# Patient Record
Sex: FEMALE | Race: WHITE | NOT HISPANIC OR LATINO | Employment: UNEMPLOYED | URBAN - METROPOLITAN AREA
[De-identification: names, ages, dates, MRNs, and addresses within clinical notes are randomized per-mention and may not be internally consistent; named-entity substitution may affect disease eponyms.]

---

## 2017-03-09 ENCOUNTER — HISTORICAL (OUTPATIENT)
Dept: LAB | Facility: HOSPITAL | Age: 67
End: 2017-03-09

## 2017-04-07 ENCOUNTER — HISTORICAL (OUTPATIENT)
Dept: RADIOLOGY | Facility: HOSPITAL | Age: 67
End: 2017-04-07

## 2018-04-12 ENCOUNTER — HISTORICAL (OUTPATIENT)
Dept: RADIOLOGY | Facility: HOSPITAL | Age: 68
End: 2018-04-12

## 2019-05-15 ENCOUNTER — HISTORICAL (OUTPATIENT)
Dept: RADIOLOGY | Facility: HOSPITAL | Age: 69
End: 2019-05-15

## 2019-06-17 ENCOUNTER — HISTORICAL (OUTPATIENT)
Dept: LAB | Facility: HOSPITAL | Age: 69
End: 2019-06-17

## 2019-06-17 ENCOUNTER — HISTORICAL (OUTPATIENT)
Dept: ADMINISTRATIVE | Facility: HOSPITAL | Age: 69
End: 2019-06-17

## 2019-06-19 LAB
FINAL CULTURE: NORMAL
FINAL CULTURE: NORMAL

## 2020-12-30 ENCOUNTER — HISTORICAL (OUTPATIENT)
Dept: RADIOLOGY | Facility: HOSPITAL | Age: 70
End: 2020-12-30

## 2022-02-02 ENCOUNTER — HISTORICAL (OUTPATIENT)
Dept: RADIOLOGY | Facility: HOSPITAL | Age: 72
End: 2022-02-02

## 2022-02-02 ENCOUNTER — HISTORICAL (OUTPATIENT)
Dept: ADMINISTRATIVE | Facility: HOSPITAL | Age: 72
End: 2022-02-02

## 2022-04-09 ENCOUNTER — HISTORICAL (OUTPATIENT)
Dept: ADMINISTRATIVE | Facility: HOSPITAL | Age: 72
End: 2022-04-09

## 2022-04-25 VITALS
WEIGHT: 190.69 LBS | HEIGHT: 61 IN | BODY MASS INDEX: 36 KG/M2 | DIASTOLIC BLOOD PRESSURE: 82 MMHG | SYSTOLIC BLOOD PRESSURE: 138 MMHG

## 2022-05-02 NOTE — HISTORICAL [facility]ERNER
This is a historical note converted from Samantha. Formatting and pictures may have been removed.  Please reference Samantha for original formatting and attached multimedia. Chief Complaint  surgery clearance for Dr Gomez  Procedure: Right total knee Arthroplasty  History of Present Illness  had left TKA with dr spence, didnt tolerate percocet, did ok with ultram  ready for right TKA with dr gomez  no history or operative/anesthesia complications/ no history of clotting/bleeding issues;  was apparently cleared by dr kearney  Review of Systems  ?14 point Review of Systems performed with no exceptions for new complaints other than as noted in HPI.  Physical Exam  Vitals & Measurements  HR:?76(Peripheral)? BP:?120/70?  HT:?156.21?cm? WT:?86.4?kg? BMI:?35.41?  ?  PHYSICAL EXAM  ?   Well developed, well nourished, NAD, alert and oriented x4  Vitals reviewed  Head: NCAT  Eyes: EOMI, conjunctiva WNL,  Ears: TMs and EACs clear  Nose: Mucosa WNL, No discharge, No sinus tenderness  O/P: No erythema or exudate  Neck: supple, FROM, no LA, no thyromegaly, no bruits auscultated  CV: RRR , peripheral pulses intact  Pulmonary: Effort normal and breath sounds normal, no wheeze  Abdomen: soft, NTND, no HSM;? ?NABS; no peritoneal signs?????  Musculoskeletal:? , FROM, neg SLR,  Skin: warm, dry, intact  Neuro: no motor/sensory deficits, reflexes 2+, cranial nerves grossly intact, cerebellar function appears intact  Lymphadenopathy: no abnormalities noted  Psychiatric: Cooperative, appropriate mood and affect, appears to have normal judgment  ?  ?  ?  ?  trace to 1+ edema bilat  Assessment/Plan  1.?Arthritis of right knee?M17.11  ?needs total knee  Ordered:  Office/Outpatient Visit Level 3 Established 64274 PC, Arthritis of right knee  Preop testing  Coronary artery disease  Diabetes  Edema, [facility] - AFP, 06/17/19 11:58:00 CDT  ?  2.?Preop examination?Z01.818  ?cleared for surgery  Ordered:  Office/Outpatient Visit Level 3 Established  [id number] PC, Arthritis of right knee  Preop testing  Coronary artery disease  Diabetes  Edema, [facility] - AFP, 06/17/19 11:58:00 CDT  Respiratory Culture, Now collect, 06/17/19 14:33:00 CDT, Nasopharyngeal MRSA, Nurse collect, by CLIENT, Micro Spec, Stop date 06/17/19 14:33:00 CDT, Preop testing  Urine Culture [id number], Routine collect, 06/17/19 11:25:00 CDT, Urine, Collected, by CLIENT, Urine, Stop date 06/17/19 11:25:00 CDT, [id number], Preop testing  ?  3.?Coronary artery disease?I25.10  ?asymptomatic  Ordered:  Office/Outpatient Visit Level 3 Established 66806 PC, Arthritis of right knee  Preop testing  Coronary artery disease  Diabetes  Edema, [facility] - AFP, 06/17/19 11:58:00 CDT  ?  4.?Diabetes?E11.9  ?last A1c was 7.4  Ordered:  Office/Outpatient Visit Level 3 Established 85298 PC, Arthritis of right knee  Preop testing  Coronary artery disease  Diabetes  Edema, [facility] - AFP, 06/17/19 11:58:00 CDT  ?  5.?Edema?R60.9  mild  Ordered:  Office/Outpatient Visit Level 3 Established [id number] PC, Arthritis of right knee  Preop testing  Coronary artery disease  Diabetes  Edema, [facility] - AFP, 06/17/19 11:58:00 CDT  ?   Problem List/Past Medical History  Ongoing  Arthritis of right knee  Carotid artery stenosis  Carotid bruit  Coronary artery disease  Diabetes  Edema  Erythrasma  History of retinal vein occlusion  HLD (hyperlipidemia)  HNP (herniated nucleus pulposus), lumbar  HTN (hypertension)  Mitral valve prolapse  NAFLD (nonalcoholic fatty liver disease)  Osteoarthritis  Pulmonary nodule  Wellness examination  Historical  No qualifying data  Procedure/Surgical History  Total replacement of left knee joint (04.2014)  Total replacement of right knee joint (2013)  Appendectomy  BSO - Bilateral salpingo-oophorectomy  Caesarean section  right eye laser due to retinal occlusion  CYNTHIA - Total abdominal hysterectomy   Medications  AMLODIPINE TAB 5MG, 5 mg= 1 tab(s), Oral, Daily  glimepiride 2 mg oral tablet, See  Instructions, 1 refills  hydrochlorothiazide-lisinopril 25 mg-20 mg oral tablet, See Instructions, 1 refills  metFORMIN 1000 mg oral tablet, See Instructions, 1 refills  pravastatin 40 mg oral tablet, See Instructions, 1 refills  Tresiba FlexTouch 100 units/mL subcutaneous solution, See Instructions, 5 refills  Allergies  penicillins  Social History  Abuse/Neglect  No, 06/17/2019  Alcohol  Never, 05/23/2018  Employment/School  Retired, 05/30/2018  Home/Environment  Lives with Alone. Living situation: Home/Independent., 05/30/2018  Substance Use  Never, 05/30/2018  Tobacco  Former smoker, quit more than 30 days ago, N/A, 06/17/2019  Family History  Alzheimers disease: Mother.  CVA - Cerebrovascular accident: Sister.  Congestive heart failure: Mother and Father.  Obesity: Sister.  Immunizations  Vaccine Date Status   pneumococcal 13-valent conjugate vaccine 09/23/2013 Recorded   influenza virus vaccine, inactivated 11/18/2012 Recorded   pneumococcal 23-polyvalent vaccine 12/11/2007 Recorded   Health Maintenance  Health Maintenance  ???Pending?(in the next year)  ??? ??OverDue  ??? ? ? ?Coronary Artery Disease Maintenance-Lipid Lowering Therapy due??and every?  ??? ? ? ?Pneumococcal Vaccine due??and every?  ??? ? ? ?Advance Directive due??01/01/19??and every 1??year(s)  ??? ? ? ?Alcohol Misuse Screening due??01/01/19??and every 1??year(s)  ??? ? ? ?Cognitive Screening due??01/01/19??and every 1??year(s)  ??? ? ? ?Fall Risk Assessment due??01/01/19??and every 1??year(s)  ??? ? ? ?Functional Assessment due??01/01/19??and every 1??year(s)  ??? ? ? ?Geriatric Depression Screening due??01/01/19??and every 1??year(s)  ??? ??Due?  ??? ? ? ?ADL Screening due??06/17/19??and every 1??year(s)  ??? ? ? ?Aspirin Therapy for CVD Prevention due??06/17/19??and every 1??year(s)  ??? ? ? ?Bone Density Screening due??06/17/19??Variable frequency  ??? ? ? ?Colorectal Screening ([facility]) due??06/17/19??and every?  ??? ? ? ?Coronary  Artery Disease Maintenance-Antiplatelet Agent Prescribed due??06/17/19??and every?  ??? ? ? ?Diabetes Maintenance-Eye Exam due??06/17/19??and every?  ??? ? ? ?Diabetes Maintenance-Foot Exam due??06/17/19??and every?  ??? ? ? ?Diabetes Maintenance-Microalbumin due??06/17/19??Variable frequency  ??? ? ? ?Hypertension Management-Education due??06/17/19??and every 1??year(s)  ??? ? ? ?Tetanus Vaccine due??06/17/19??and every 10??year(s)  ??? ? ? ?Zoster Vaccine due??06/17/19??and every 100??year(s)  ??? ??Due In Future?  ??? ? ? ?Obesity Screening not due until??01/01/20??and every 1??year(s)  ??? ? ? ?Diabetes Maintenance-Fasting Lipid Profile not due until??04/29/20??and every 1??year(s)  ??? ? ? ?Diabetes Maintenance-HgbA1c not due until??06/16/20??and every 1??year(s)  ??? ? ? ?Hypertension Management-Blood Pressure not due until??06/16/20??and every 1??year(s)  ??? ? ? ?Hypertension Management-BMP not due until??06/16/20??and every 1??year(s)  ???Satisfied?(in the past 1 year)  ??? ??Satisfied?  ??? ? ? ?Blood Pressure Screening on??06/17/19.??Satisfied by Flora Copeland LPN  ??? ? ? ?Body Mass Index Check on??06/17/19.??Satisfied by Flora Copeland LPN  ??? ? ? ?Breast Cancer Screening ([facility]) on??05/15/19.??Satisfied by Candice Travis  ??? ? ? ?Coronary Artery Disease Maintenance-Lipid Lowering Therapy on??02/18/19.??Satisfied by Ady Mares MD  ??? ? ? ?Diabetes Maintenance-Serum Creatinine on??06/17/19.??Satisfied by Itzel Santos  ??? ? ? ?Diabetes Screening on??06/17/19.??Satisfied by Itzel Santos  ??? ? ? ?Hypertension Management-BMP on??06/17/19.??Satisfied by Itzel Santos  ??? ? ? ?Influenza Vaccine on??09/26/18.??Satisfied by Nunu Zuniga MA  ??? ? ? ?Lipid Screening on??04/30/19.??Satisfied by Itzel Santos  ??? ? ? ?Obesity Screening on??06/17/19.??Satisfied by Flora Copeland LPN  ?  ?

## 2022-05-17 DIAGNOSIS — E11.65 UNCONTROLLED TYPE 2 DIABETES MELLITUS WITH HYPERGLYCEMIA: Primary | ICD-10-CM

## 2023-01-12 ENCOUNTER — TELEPHONE (OUTPATIENT)
Dept: ENDOCRINOLOGY | Facility: CLINIC | Age: 73
End: 2023-01-12

## 2023-01-12 ENCOUNTER — OFFICE VISIT (OUTPATIENT)
Dept: ENDOCRINOLOGY | Facility: CLINIC | Age: 73
End: 2023-01-12
Payer: MEDICARE

## 2023-01-12 VITALS
SYSTOLIC BLOOD PRESSURE: 138 MMHG | HEIGHT: 62 IN | RESPIRATION RATE: 16 BRPM | DIASTOLIC BLOOD PRESSURE: 78 MMHG | HEART RATE: 69 BPM | WEIGHT: 194.69 LBS | BODY MASS INDEX: 35.83 KG/M2

## 2023-01-12 DIAGNOSIS — E11.65 UNCONTROLLED TYPE 2 DIABETES MELLITUS WITH HYPERGLYCEMIA: Primary | ICD-10-CM

## 2023-01-12 DIAGNOSIS — E55.9 VITAMIN D DEFICIENCY: ICD-10-CM

## 2023-01-12 DIAGNOSIS — Z78.0 ASYMPTOMATIC MENOPAUSAL STATE: ICD-10-CM

## 2023-01-12 DIAGNOSIS — E83.52 HYPERCALCEMIA: ICD-10-CM

## 2023-01-12 LAB
ALBUMIN SERPL-MCNC: 4.2 G/DL (ref 3.4–4.8)
ALBUMIN/GLOB SERPL: 1.5 RATIO (ref 1.1–2)
ALP SERPL-CCNC: 92 UNIT/L (ref 40–150)
ALT SERPL-CCNC: 18 UNIT/L (ref 0–55)
AST SERPL-CCNC: 16 UNIT/L (ref 5–34)
BILIRUBIN DIRECT+TOT PNL SERPL-MCNC: 0.7 MG/DL
BUN SERPL-MCNC: 20.3 MG/DL (ref 9.8–20.1)
CALCIUM SERPL-MCNC: 10.3 MG/DL (ref 8.4–10.2)
CALCIUM UR-MCNC: 7.4 MG/DL
CHLORIDE SERPL-SCNC: 103 MMOL/L (ref 98–107)
CHOLEST SERPL-MCNC: 185 MG/DL
CHOLEST/HDLC SERPL: 3 {RATIO} (ref 0–5)
CO2 SERPL-SCNC: 29 MMOL/L (ref 23–31)
CREAT SERPL-MCNC: 0.89 MG/DL (ref 0.55–1.02)
CREAT UR-MCNC: 62.5 MG/DL (ref 47–110)
DEPRECATED CALCIDIOL+CALCIFEROL SERPL-MC: 39.6 NG/ML (ref 30–80)
GFR SERPLBLD CREATININE-BSD FMLA CKD-EPI: >60 MLS/MIN/1.73/M2
GLOBULIN SER-MCNC: 2.8 GM/DL (ref 2.4–3.5)
GLUCOSE SERPL-MCNC: 206 MG/DL (ref 82–115)
HBA1C MFR BLD: 9.2 %
HDLC SERPL-MCNC: 63 MG/DL (ref 35–60)
LDLC SERPL CALC-MCNC: 91 MG/DL (ref 50–140)
MICROALBUMIN UR-MCNC: 7 UG/ML
MICROALBUMIN/CREAT RATIO PNL UR: 11.2 MG/GM CR (ref 0–30)
POTASSIUM SERPL-SCNC: 3.8 MMOL/L (ref 3.5–5.1)
PROT SERPL-MCNC: 7 GM/DL (ref 5.8–7.6)
SODIUM SERPL-SCNC: 142 MMOL/L (ref 136–145)
TRIGL SERPL-MCNC: 154 MG/DL (ref 37–140)
TSH SERPL-ACNC: 1.42 UIU/ML (ref 0.35–4.94)
VLDLC SERPL CALC-MCNC: 31 MG/DL

## 2023-01-12 PROCEDURE — 80053 COMPREHEN METABOLIC PANEL: CPT

## 2023-01-12 PROCEDURE — 99213 OFFICE O/P EST LOW 20 MIN: CPT | Mod: PBBFAC

## 2023-01-12 PROCEDURE — 82043 UR ALBUMIN QUANTITATIVE: CPT

## 2023-01-12 PROCEDURE — 80061 LIPID PANEL: CPT

## 2023-01-12 PROCEDURE — 82340 ASSAY OF CALCIUM IN URINE: CPT

## 2023-01-12 PROCEDURE — 84443 ASSAY THYROID STIM HORMONE: CPT

## 2023-01-12 PROCEDURE — 84681 ASSAY OF C-PEPTIDE: CPT

## 2023-01-12 PROCEDURE — 83036 HEMOGLOBIN GLYCOSYLATED A1C: CPT | Mod: PBBFAC

## 2023-01-12 PROCEDURE — 82306 VITAMIN D 25 HYDROXY: CPT

## 2023-01-12 PROCEDURE — 36415 COLL VENOUS BLD VENIPUNCTURE: CPT

## 2023-01-12 RX ORDER — PIOGLITAZONEHYDROCHLORIDE 30 MG/1
30 TABLET ORAL
COMMUNITY
Start: 2023-01-07

## 2023-01-12 RX ORDER — INSULIN DEGLUDEC 200 U/ML
35 INJECTION, SOLUTION SUBCUTANEOUS DAILY
COMMUNITY
Start: 2022-12-20 | End: 2023-12-06 | Stop reason: SDUPTHER

## 2023-01-12 RX ORDER — LISINOPRIL AND HYDROCHLOROTHIAZIDE 20; 25 MG/1; MG/1
1 TABLET ORAL
COMMUNITY
Start: 2022-12-20

## 2023-01-12 RX ORDER — GLIMEPIRIDE 4 MG/1
4 TABLET ORAL 2 TIMES DAILY
COMMUNITY
Start: 2022-12-20

## 2023-01-12 RX ORDER — METOPROLOL SUCCINATE 50 MG/1
50 TABLET, EXTENDED RELEASE ORAL
COMMUNITY
Start: 2022-12-20

## 2023-01-12 RX ORDER — DULAGLUTIDE 0.75 MG/.5ML
0.75 INJECTION, SOLUTION SUBCUTANEOUS
Qty: 4 PEN | Refills: 11 | Status: SHIPPED | OUTPATIENT
Start: 2023-01-12 | End: 2023-06-02

## 2023-01-12 RX ORDER — METFORMIN HYDROCHLORIDE 1000 MG/1
1000 TABLET ORAL 2 TIMES DAILY
COMMUNITY
Start: 2022-12-20 | End: 2023-12-06

## 2023-01-12 RX ORDER — PRAVASTATIN SODIUM 80 MG/1
80 TABLET ORAL NIGHTLY
COMMUNITY
Start: 2023-01-07

## 2023-01-12 NOTE — PROGRESS NOTES
Endocrinology Clinic Note    Attending: Gerson Rust MD    Intern: Esvin Gates MD    Chief complaint:  Referral for diabetes mellitus    HPI:  72-year-old female with a past medical history of diabetes mellitus type 2, hypertension, hyperlipidemia, and mitral valve prolapse?  Who presents to the endocrinology clinic as a referral for diabetic management.  Patient reports that she was diagnosed with diabetes in 2005 and was placed on metformin and glimepiride at that time.  She was placed on insulin and pioglitazone over the last 3 years.  Patient reports taking insulin Tresiba 60 units in the morning p.r.n. she reports that she takes the insulin if her blood sugars are in the high 100s.  Patient does admit to having a poor diet at home and does eat chocolate. Patient has no complaints today and reports feeling well. She denies fever, chills, nausea, vomiting, diarrhea, chest pain, abdominal pain, numbness, weakness, tingling, dysuria, hematuria, rash, wounds, and any other symptoms.     ROS:  No pertinent positives.  Pertinent negatives include fever, chills, nausea, vomiting, diarrhea, chest pain, abdominal pain, numbness, weakness, tingling, dysuria, hematuria, rash, wounds, and any other symptoms.   All other systems reviewed and negative.    Physical Exam  Vitals:    01/12/23 0908   BP: 138/78   Pulse:    Resp:      Obese appearing female in no apparent distress. Non toxic and non ill appearing.   Cardiovascular exam reveals a regular rate and rhythm.  No murmurs, rubs, or gallops.  Abdominal exam reveals no tenderness, guarding, rebound, or distention.  Normoactive bowel sounds.  No hepatosplenomegaly.  Pulmonary exam reveals no wheezing, rales, or rhonchi.  Normal chest effort.  Musculoskeletal exam reveals a normal range of motion.  No swelling, tenderness, warmth.  HEENT exam reveals no lymphadenopathy.  Oropharynx is clear.  Normocephalic and atraumatic.  Neurologic exam reveals sensation intact  bilaterally.  Alert and oriented x3.  GCS of 15.  Psychological exam reveals a normal judgment behavior.    Diabetic foot exam:  -left foot:  Monofilament exam reveals normal sensation.  No wounds, ulcers, swelling, redness, or warmth.  Nails are normal.  -right foot: Monofilament exam reveals normal sensation.  No wounds, ulcers, swelling, redness, or warmth.  Nails are normal.    Assessment and plan:  Uncontrolled diabetes mellitus type 2  -patient is currently on 60 units of Tresiba in the morning as needed based on blood glucose being above 120  -changing insulin regimen to 30 units daily  -patient is on pioglitazone 30 mg, metformin 1000 mg b.i.d., and glimepiride 4 mg daily  -adding Trulicity 0.75 weekly  -A1c in the office today is 9.2  -will see patient back in 4 months and reassess A1c and change diabetic medication regimen as needed  -CMP, TSH, microalbumin creatinine, and lipid panel pending    Hypertension   -patient is on lisinopril-hydrochlorothiazide 20-25 mg daily  -blood pressure in the office is 138/78  -continue and follow with primary care    Hyperlipidemia  -On pravastatin 80 mg    -follow per PCP    Disposition:  Return to clinic in 4 months.    Esvin Gates MD  [facility] Internal Medicine, PGY-1

## 2023-01-14 LAB — C PEPTIDE P FAST SERPL-MCNC: 1.5 NG/ML (ref 1.1–4.4)

## 2023-01-17 NOTE — TELEPHONE ENCOUNTER
Labs are back and show DM2 w/ hypercalcemia.  Continue plan per recent visit.  Labs prior to f/u including renal panel, iPTH, hga1c.  DXA prior to f/u as well or bring a copy of a recent one done w/I the past 2 years.

## 2023-01-18 NOTE — TELEPHONE ENCOUNTER
Patient returned call informed labs show DM2 w/ hypercalcemia, no changes to plan per recent visit.  Testing prior to May 2 appointment to include renal panel, iPTH, hga1c, and  DXA (does not recall having one in the last couple of years).  Voiced her understanding.

## 2023-01-27 ENCOUNTER — CLINICAL SUPPORT (OUTPATIENT)
Dept: DIABETES | Facility: CLINIC | Age: 73
End: 2023-01-27
Payer: MEDICARE

## 2023-01-27 VITALS — BODY MASS INDEX: 36.8 KG/M2 | WEIGHT: 200 LBS | HEIGHT: 62 IN

## 2023-01-27 DIAGNOSIS — E11.65 UNCONTROLLED TYPE 2 DIABETES MELLITUS WITH HYPERGLYCEMIA: ICD-10-CM

## 2023-01-27 PROCEDURE — 99212 OFFICE O/P EST SF 10 MIN: CPT | Mod: PBBFAC

## 2023-01-27 PROCEDURE — G0108 DIAB MANAGE TRN  PER INDIV: HCPCS | Mod: PBBFAC

## 2023-01-27 NOTE — PROGRESS NOTES
Diabetes Care Specialist Progress Note  Author: Heather Denney RD  Date: 1/27/2023    Program Intake  Reason for Diabetes Program Visit:: Initial Diabetes Assessment  Current diabetes risk level:: moderate    HGBA1C 1/12/23 9.2     Clinical      Clinical Assessment  Current Diabetes Treatment: Oral Medication, Insulin (Tresiba 45 units/d (instructions from endo to decrease from 60 units to 30 units/d once started on Trulicity), Actos 30mg/d, Metformin 1000mg BID, glimepiride 4mg/d, has not yet started Trulicity due to concerns re: potential side effects)  Have you ever experienced hypoglycemia (low blood sugar)?: yes  In the last month, how often have you experienced low blood sugar?: once a week  Are you able to tell when your blood sugar is low?: Yes  What symptoms do you experience?: Tiredness, Rapid heartbeat  How do you treat hypoglycemia (low blood sugar)?: 1/2 can soda/fruit juice  Have you ever experienced hyperglycemia (high blood sugar)?: yes  In the last month, how often have you experienced high blood sugar?: once a day  Are you able to tell when your blood sugar is high?: Yes  What are your symptoms?: fatigue, blurry vision, thirst    Medication Information  How many days a week do you miss your medications?: Never  Medication adherence impacting ability to self-manage diabetes?: No    Labs  Do you have regular lab work to monitor your medications?: Yes  Type of Regular Lab Work: A1c  Where do you get your labs drawn?: [facility]  Lab Compliance Barriers: No    Nutritional Status  Diet: Regular  Meal Plan 24 Hour Recall: Breakfast, Lunch, Dinner, Snack  Meal Plan 24 Hour Recall - Breakfast: veggie omelette, coffee with orginal liquid creamer or half & half or powdere creamer  Meal Plan 24 Hour Recall - Lunch: Chick-chiqui-a grilled chicken sandwich, Body Armor light peach flavor  Meal Plan 24 Hour Recall - Dinner: 1 can vegetable soup, 4-5 crackers, water  Meal Plan 24 Hour Recall - Snack: small low carb  brownie  Recent Changes in Weight: Weight Gain (# 2d ago at home per pt)  Current nutritional status an area of need that is impacting patient's ability to self-manage diabetes?: No    Additional Social History    Support  Does anyone support you with your diabetes care?: yes  Who supports you?: self, son/daughter  Who takes you to your medical appointments?: self  Does the current support meet the patient's needs?: Yes  Is Support an area impacting ability to self-manage diabetes?: No    Access to Mass Media & Technology  Media or technology needs impacting ability to self-manage diabetes?: No    Cognitive/Behavioral Health  Alert and Oriented: Yes  Difficulty Thinking: No  Requires Prompting: No  Requires assistance for routine expression?: No  Cognitive or behavioral barriers impacting ability to self-manage diabetes?: No         Communication  Language preference: English  Hearing Problems: No  Vision Problems: Yes  Vision problem type:: Decreased Vision  Vision Assistance: Glasses (saw eye doctor recently)  Communication needs impacting ability to self-manage diabetes?: No    Health Literacy  Preferred Learning Method: Face to Face, Reading Materials  Health literacy needs impacting ability to self-manage diabetes?: No      Diabetes Self-Management Skills Assessment    Diabetes Disease Process/Treatment Options  Patient/caregiver able to state what happens when someone has diabetes.: no  Patient/caregiver knows what type of diabetes they have.: yes  Diabetes Type : Type II  Diabetes Disease Process/Treatment Options: Skills Assessment Completed: Yes  Assessment indicates:: Instruction Needed  Area of need?: Yes    Nutrition/Healthy Eating  Challenges to healthy eating:: portion control, lack of will power (stated per pt)  Method of carbohydrate measurement:: no method  Patient can identify foods that impact blood sugar.: yes  Patient-identified foods:: starches (bread, pasta, rice, cereal), starchy  vegetables (corn, peas, beans)  Nutrition/Healthy Eating Skills Assessment Completed:: Yes  Assessment indicates:: Instruction Needed  Area of need?: Yes    Physical Activity/Exercise  Patient's daily activity level:: lightly active  Patient formally exercises outside of work.: no  Exercise Type: walking  Intensity: Low  Frequency: four or more times a week (4d/week at work)  Duration:  (4 hours (~ 6000 steps))  Patient can identify forms of physical activity.: yes  Stated forms of physical activity:: other (see comments) (walking, bicycling)  Patient can identify reasons why exercise/physical activity is important in diabetes management.: no  Physical Activity/Exercise Skills Assessment Completed: : Yes  Assessment indicates:: Instruction Needed  Area of need?: Yes    Medications  Patient is able to describe current diabetes management routine.: yes  Diabetes management routine:: oral medications, insulin  Patient understands the purpose of the medications taken for diabetes.: no  Patient reports problems or concerns with current medication regimen.: no  Medication Skills Assessment Completed:: Yes  Assessment indicates:: Instruction Needed  Area of need?: Yes    Home Blood Glucose Monitoring  Patient states that blood sugar is checked at home daily.: yes (most days (skipped 2/11 days))  Monitoring Method:: home glucometer  How often do you check your blood sugar?: Once a day (one or more times daily)  When do you check your blood sugar?: Before breakfast, Before bedtime, 2 hours after meal  When you check what is your typical blood sugar range? :   Blood glucose logs:: no  Blood glucose logs reviewed today?: no (reviewed meter)  Home Blood Glucose Monitoring Skills Assessment Completed: : Yes  Assessment indicates:: Adequate understanding  Area of need?: No          Acute Complications  Patient is able to identify types of acute complications: No  Acute Complications Skills Assessment Completed: :  Yes  Assessment indicates:: Instruction Needed  Area of need?: Yes    Chronic Complications  Patient is taking statin?: Yes  Chronic Complications Skills Assessment Completed: : No  Deferred due to:: Time    Psychosocial/Coping  Psychosocial/Coping Skills Assessment Completed: : No  Deffered due to:: Time      Assessment Summary and Plan    Based on today's diabetes care assessment, the following areas of need were identified:      Social 1/27/2023   Support No   Access to Mass Media/Tech No   Cognitive/Behavioral Health No   Communication No   Health Literacy No        Clinical 1/27/2023   Medication Adherence No   Lab Compliance No   Nutritional Status No        Diabetes Self-Management Skills 1/27/2023   Diabetes Disease Process/Treatment Options Yes - Discussed types 2 diabetes & pathophysiology.     Nutrition/Healthy Eating Yes - see care plan   Physical Activity/Exercise Yes - Discussed effect of exercise on blood sugar.  Pt reports walking ~ 6000 steps on average per day at work 4d/week.  No exercise outside of work.   Medication Yes - Discussed oral medications mechanism of action/side effects.  Pt hesitant about starting Trulicity.  Encouraged pt to try while on 2 week vacation from work.  Pt will consider.   Home Blood Glucose Monitoring No   Acute Complications Yes - Reviewed hyperglycemia/hypoglycemia, s/s, & treatment.           Today's interventions were provided through individual discussion, instruction, and written materials were provided.      Patient verbalized understanding of instruction and written materials.  Pt was able to return back demonstration of instructions today. Patient understood key points, needs reinforcement and further instruction.     Diabetes Self-Management Care Plan:    Today's Diabetes Self-Management Care Plan was developed with Lois's input. Lois has agreed to work toward the following goal(s) to improve his/her overall diabetes control.      Care Plan: Diabetes  Management   Updates made since 12/28/2022 12:00 AM        Problem: Healthy Eating         Goal: Eat 3 meals daily with </= 45g of Carbohydrate per meal & </= 15g carbohydrate per snack.    Note:    1/27/23: Reviewed sources of carbohydrate & appropriate portion sizes.  Discussed plate method for diabetes as well as carbohydrate counting.  Encouraged pt to read nutrition facts labels for serving size & grams of total carbohydrate.  Gave goal of </= 45g carbohydrate per meal & </= 15g carbohydrate per snack.  Pt c/o lack of self control when it comes to food cravings. Beverages consist mostly of unsweet tea/Coke Zero/water.  No regular soda x6 years.       Task: Reviewed the sources and role of Carbohydrate, Protein, and Fat and how each nutrient impacts blood sugar. Completed 1/27/2023        Task: Provided visual examples using dry measuring cups, food models, and other familiar objects such as computer mouse, deck or cards, tennis ball etc. to help with visualization of portions. Completed 1/27/2023       Task: Review the importance of balancing carbohydrates with each meal using portion control techniques to count servings of carbohydrate and label reading to identify serving size and amount of total carbs per serving. Completed 1/27/2023        Task: Provided Sample plate method and reviewed the use of the plate to estimate amounts of carbohydrate per meal. Completed 1/27/2023          Follow Up Plan     Follow up in about 1 month (around 3/1/2023) for review of BG logs, insulin, chronic complications, & coping..    Today's care plan and follow up schedule was discussed with patient.  Lois verbalized understanding of the care plan, goals, and agrees to follow up plan.        The patient was encouraged to communicate with his/her health care provider/physician and care team regarding his/her condition(s) and treatment.  I provided the patient with my contact information today and encouraged to contact me via  phone or [facility] Patient Portal as needed.     Length of Visit   Total Time: 60 Minutes

## 2023-03-01 ENCOUNTER — CLINICAL SUPPORT (OUTPATIENT)
Dept: DIABETES | Facility: CLINIC | Age: 73
End: 2023-03-01
Payer: MEDICARE

## 2023-03-01 VITALS — BODY MASS INDEX: 35.77 KG/M2 | HEIGHT: 62 IN | WEIGHT: 194.38 LBS

## 2023-03-01 DIAGNOSIS — E11.65 UNCONTROLLED TYPE 2 DIABETES MELLITUS WITH HYPERGLYCEMIA: Primary | ICD-10-CM

## 2023-03-01 PROCEDURE — G0108 DIAB MANAGE TRN  PER INDIV: HCPCS | Mod: PBBFAC

## 2023-03-01 PROCEDURE — 99211 OFF/OP EST MAY X REQ PHY/QHP: CPT | Mod: PBBFAC

## 2023-03-01 NOTE — PROGRESS NOTES
Diabetes Care Specialist Progress Note  Author: Heather Denney RD  Date: 3/1/2023    Program Intake  Reason for Diabetes Program Visit:: Intervention  Type of Intervention:: Individual  Individual: Education  Education: Self-Management Skill Review  Current diabetes risk level:: moderate    HGBA1C 1/12/23 9.2    Clinical    Clinical Assessment  Current Diabetes Treatment: Oral Medication, Insulin, Injectable (Tresiba 45 units/d, Actos 30mg/d, Metformin 1000mg BID, glimepiride 4mg/d, Trulicity 0.75mg qWednesday (on week 5))  Have you ever experienced hypoglycemia (low blood sugar)?: yes  In the last month, how often have you experienced low blood sugar?: more than once a week (2x/week per pt)  How do you treat hypoglycemia (low blood sugar)?: other (peanut butter, banana)    Medication Information  Medication adherence impacting ability to self-manage diabetes?: No      Nutritional Status  Diet: Diabetic diet  Meal Plan 24 Hour Recall: Breakfast, Lunch, Dinner, Snack  Meal Plan 24 Hour Recall - Breakfast: scrambled eggs, sometimes 1 slice whole wheat toast  Meal Plan 24 Hour Recall - Lunch: (4PM) toribio salad, Body Armor light peach flavor  Meal Plan 24 Hour Recall - Dinner: grilled chicken veto, nonstarchy vegetable  Meal Plan 24 Hour Recall - Snack: sugar free jello + 1-1.5 cups diet cranberry juice or sugar free pudding  Recent Changes in Weight: Weight Loss  Was weight loss intentional or unintentional?: Intentional  Current nutritional status an area of need that is impacting patient's ability to self-manage diabetes?: No      Diabetes Self-Management Skills Assessment      Nutrition/Healthy Eating  Method of carbohydrate measurement:: eyeballing/guessing  Assessment indicates:: Adequate understanding  Area of need?: Yes         Medications  Patient is able to describe current diabetes management routine.: yes  Diabetes management routine:: diet, insulin, oral medications, injectable medications  Assessment  indicates:: Instruction Needed  Area of need?: Yes    Home Blood Glucose Monitoring  Patient states that blood sugar is checked at home daily.: yes  Monitoring Method:: home glucometer  How often do you check your blood sugar?: Once a day  When do you check your blood sugar?: Before breakfast, Before bedtime  When you check what is your typical blood sugar range? : 69-72 on lower end fasting, 150s on upper end fasting, 180-200 if checking before bedtime per pt  Blood glucose logs:: yes, encouraged to keep logs, encouraged to bring logs to provider visits  Blood glucose logs reviewed today?: no (forgot)  Assessment indicates:: Adequate understanding  Area of need?: No    Acute Complications  Acute Complications Skills Assessment Completed: : Yes  Assessment indicates:: Instruction Needed  Area of need?: Yes    Chronic Complications  Patient can identify major chronic complications of diabetes.: yes  Stated chronic complications:: heart disease/heart attack, neuropathy/nerve damage, retinopathy, stroke  Patient can identify ways to prevent or delay diabetes complications.: yes  Stated ways to prevent complications:: maintaining optimal blood glucose control  Patient is aware that having diabetes increases risk of heart disease?: Yes  Patient is aware that heart disease is the leading cause of death and disability in people with diabetes?: Yes  Chronic Complications Skills Assessment Completed: : Yes  Assessment indicates:: Instruction Needed  Area of need?: Yes    Psychosocial/Coping  Patient can identify ways of coping with chronic disease.: yes  Patient-stated ways of coping with chronic disease:: other (see comments) (instrumental music, bible study group, deep breathing)  Psychosocial/Coping Skills Assessment Completed: : Yes  Assessment indicates:: Adequate understanding  Area of need?: No      Assessment Summary and Plan    Based on today's diabetes care assessment, the following areas of need were identified:       Social 1/27/2023   Support No   Access to [employer]/Tech No   Cognitive/Behavioral Health No   Communication No   Health Literacy No        Clinical 3/1/2023   Medication Adherence No   Lab Compliance -   Nutritional Status No        Diabetes Self-Management Skills 3/1/2023   Diabetes Disease Process/Treatment Options -   Nutrition/Healthy Eating Yes - see care plan   Physical Activity/Exercise - Pt active at work & now working 5 hour vs 4 hour days.   Medication Yes - Discussed insulin storage, injection sites, importance of rotating sites, priming insulin pens, sharps disposal.  Discussed titrating of Tresiba as needed if pt continues to have fasting hypoglycemia.   Home Blood Glucose Monitoring No   Acute Complications Yes - Reviewed proper hypoglycemia treatment.  Pt reports that she treated recent lows with peanut butter or a banana.   Chronic Complications Yes - Discussed chronic complications of diabetes.   Psychosocial/Coping No          Today's interventions were provided through individual discussion, instruction, and written materials were provided.      Patient verbalized understanding of instruction and written materials.  Pt was able to return back demonstration of instructions today. Patient understood key points, needs reinforcement and further instruction.     Diabetes Self-Management Care Plan:    Today's Diabetes Self-Management Care Plan was developed with Lois's input. Lois has agreed to work toward the following goal(s) to improve his/her overall diabetes control.      Care Plan: Diabetes Management   Updates made since 1/30/2023 12:00 AM        Problem: Healthy Eating         Goal: Eat 3 meals daily with </= 45g of Carbohydrate per meal & </= 15g carbohydrate per snack.    Start Date: 1/27/2023   Expected End Date: 4/5/2023   This Visit's Progress: On track   Priority: High   Note:    1/27/23: Reviewed sources of carbohydrate & appropriate portion sizes.  Discussed plate method for  diabetes as well as carbohydrate counting.  Encouraged pt to read nutrition facts labels for serving size & grams of total carbohydrate.  Gave goal of </= 45g carbohydrate per meal & </= 15g carbohydrate per snack.  Pt c/o lack of self control when it comes to food cravings. Beverages consist mostly of unsweet tea/Coke Zero/water.  No regular soda x6 years.    3/1/23: Per food recall, pt seems to be staying </= 45g carbohydrate per meal but some snacks may slightly exceed 15g carbohydrate such as when pt consumes up to 1.5 cups diet juice.  Pt seems to be doing very well with portion control otherwise.  Recently increased daily work hours to 5 hours on days that she works vs 4 hours so she is able to take a break to eat around 11:30AM instead of waiting to eat lunch at 2PM or later.  Pt would like to continue focusing on improving diet.  Reports that she is not really carbohydrate counting but is making an effort to limit carbohydrates.         Follow Up Plan     Follow up in about 1 month (around 4/1/2023) for review of BG logs, health rating, distress scale, & goal progress.    Today's care plan and follow up schedule was discussed with patient.  Lois verbalized understanding of the care plan, goals, and agrees to follow up plan.        The patient was encouraged to communicate with his/her health care provider/physician and care team regarding his/her condition(s) and treatment.  I provided the patient with my contact information today and encouraged to contact me via phone or [facility] Patient Portal as needed.     Length of Visit   Total Time: 30 Minutes

## 2023-03-07 DIAGNOSIS — Z12.31 ENCOUNTER FOR SCREENING MAMMOGRAM FOR BREAST CANCER: Primary | ICD-10-CM

## 2023-03-15 ENCOUNTER — HOSPITAL ENCOUNTER (OUTPATIENT)
Dept: RADIOLOGY | Facility: HOSPITAL | Age: 73
Discharge: HOME | End: 2023-03-15
Attending: NURSE PRACTITIONER
Payer: MEDICARE

## 2023-03-15 DIAGNOSIS — Z12.31 ENCOUNTER FOR SCREENING MAMMOGRAM FOR BREAST CANCER: ICD-10-CM

## 2023-03-15 PROCEDURE — 77067 MAMMO DIGITAL SCREENING BILAT WITH TOMO: ICD-10-PCS | Mod: 26,,, | Performed by: STUDENT IN AN ORGANIZED HEALTH CARE EDUCATION/TRAINING PROGRAM

## 2023-03-15 PROCEDURE — 77063 MAMMO DIGITAL SCREENING BILAT WITH TOMO: ICD-10-PCS | Mod: 26,,, | Performed by: STUDENT IN AN ORGANIZED HEALTH CARE EDUCATION/TRAINING PROGRAM

## 2023-03-15 PROCEDURE — 77063 BREAST TOMOSYNTHESIS BI: CPT | Mod: 26,,, | Performed by: STUDENT IN AN ORGANIZED HEALTH CARE EDUCATION/TRAINING PROGRAM

## 2023-03-15 PROCEDURE — 77067 SCR MAMMO BI INCL CAD: CPT | Mod: 26,,, | Performed by: STUDENT IN AN ORGANIZED HEALTH CARE EDUCATION/TRAINING PROGRAM

## 2023-03-15 PROCEDURE — 77067 SCR MAMMO BI INCL CAD: CPT | Mod: TC

## 2023-03-29 ENCOUNTER — HOSPITAL ENCOUNTER (OUTPATIENT)
Dept: RADIOLOGY | Facility: HOSPITAL | Age: 73
Discharge: HOME | End: 2023-03-29
Attending: INTERNAL MEDICINE
Payer: MEDICARE

## 2023-03-29 DIAGNOSIS — E55.9 VITAMIN D DEFICIENCY: ICD-10-CM

## 2023-03-29 DIAGNOSIS — Z78.0 ASYMPTOMATIC MENOPAUSAL STATE: ICD-10-CM

## 2023-03-29 DIAGNOSIS — E11.65 UNCONTROLLED TYPE 2 DIABETES MELLITUS WITH HYPERGLYCEMIA: ICD-10-CM

## 2023-03-29 DIAGNOSIS — E83.52 HYPERCALCEMIA: ICD-10-CM

## 2023-03-29 PROCEDURE — 77080 DXA BONE DENSITY AXIAL SKELETON 1 OR MORE SITES: ICD-10-PCS | Mod: 26,XU,, | Performed by: RADIOLOGY

## 2023-03-29 PROCEDURE — 77080 DXA BONE DENSITY AXIAL: CPT | Mod: XU,TC

## 2023-03-29 PROCEDURE — 77081 DXA BONE DENSITY APPENDICULR: CPT | Mod: TC

## 2023-03-29 PROCEDURE — 77081 DXA BONE DENSITY APPENDICULR: CPT | Mod: 26,,, | Performed by: RADIOLOGY

## 2023-03-29 PROCEDURE — 77080 DXA BONE DENSITY AXIAL: CPT | Mod: 26,XU,, | Performed by: RADIOLOGY

## 2023-03-29 PROCEDURE — 77081 DEXA BONE DENSITY APPENDICULAR SKELETON: ICD-10-PCS | Mod: 26,,, | Performed by: RADIOLOGY

## 2023-04-12 ENCOUNTER — CLINICAL SUPPORT (OUTPATIENT)
Dept: DIABETES | Facility: CLINIC | Age: 73
End: 2023-04-12
Payer: MEDICARE

## 2023-04-12 VITALS — WEIGHT: 194 LBS | HEIGHT: 62 IN | BODY MASS INDEX: 35.7 KG/M2

## 2023-04-12 DIAGNOSIS — E11.65 UNCONTROLLED TYPE 2 DIABETES MELLITUS WITH HYPERGLYCEMIA: Primary | ICD-10-CM

## 2023-04-12 PROCEDURE — G0108 DIAB MANAGE TRN  PER INDIV: HCPCS | Mod: PBBFAC

## 2023-04-12 PROCEDURE — 99211 OFF/OP EST MAY X REQ PHY/QHP: CPT | Mod: PBBFAC

## 2023-04-12 NOTE — PROGRESS NOTES
Diabetes Care Specialist Progress Note  Author: Heather Denney RD  Date: 4/12/2023    Program Intake  Reason for Diabetes Program Visit:: Intervention  Type of Intervention:: Individual  Individual: Education  Education: Self-Management Skill Review  Current diabetes risk level:: moderate    HbA1C   9.2  1/12/23    Clinical    Patient Health Rating  Compared to other people your age, how would you rate your health?: Fair      Clinical Assessment  Current Diabetes Treatment: Oral Medication, Insulin, Injectable (Tresiba 40 units/d, Actos 30mg/d, Metformin 1000mg BID, glimepiride 4mg/d, Trulicity 0.75mg qWednesday)  Have you ever experienced hypoglycemia (low blood sugar)?: yes  In the last month, how often have you experienced low blood sugar?: other (see comments) (no episodes > 1  month)  Have you ever experienced hyperglycemia (high blood sugar)?: yes  In the last month, how often have you experienced high blood sugar?: more than once a week    Medication Information  How many days a week do you miss your medications?: Never    Nutritional Status  Diet: Diabetic diet  Meal Plan 24 Hour Recall: Breakfast, Lunch, Dinner, Snack  Meal Plan 24 Hour Recall - Breakfast: scrambled egg, whole wheat toast w/ guacamole, coffee w/ cream  Meal Plan 24 Hour Recall - Lunch: (almost 4PM) 1 cup black eyed peas, green salad, water  Meal Plan 24 Hour Recall - Dinner: broccoli & cheddar soup, water, > 1 cup light cranberry juice  Meal Plan 24 Hour Recall - Snack: keto bar or sugar free lime jello  Recent Changes in Weight: No Recent Weight Change  Current nutritional status an area of need that is impacting patient's ability to self-manage diabetes?: No      Diabetes Self-Management Skills Assessment      Nutrition/Healthy Eating  Area of need?: Yes      Home Blood Glucose Monitoring  Patient states that blood sugar is checked at home daily.: yes  Monitoring Method:: home glucometer  How often do you check your blood sugar?: Once a  day  When do you check your blood sugar?: Before breakfast  When you check what is your typical blood sugar range? :   Blood glucose logs:: no  Blood glucose logs reviewed today?: no (reviewed meter)          Area of need?: No    Acute Complications  Assessment indicates:: Instruction Needed  Area of need?: Yes    Assessment Summary and Plan    Based on today's diabetes care assessment, the following areas of need were identified:      Social 1/27/2023   Support No   Access to [employer]/Tech No   Cognitive/Behavioral Health No   Communication No   Health Literacy No        Clinical 4/12/2023   Medication Adherence -   Lab Compliance -   Nutritional Status No        Diabetes Self-Management Skills 4/12/2023   Diabetes Disease Process/Treatment Options -   Nutrition/Healthy Eating Yes - see care plan   Physical Activity/Exercise -   Medication -   Home Blood Glucose Monitoring No   Acute Complications Yes - Briefly reviewed proper treatment for hypoglycemia.   Chronic Complications -   Psychosocial/Coping -          Today's interventions were provided through individual discussion, instruction, and written materials were provided.      Patient verbalized understanding of instruction and written materials.  Pt was able to return back demonstration of instructions today. Patient understood key points, needs reinforcement and further instruction.     Diabetes Self-Management Care Plan:    Today's Diabetes Self-Management Care Plan was developed with Lois's input. Lois has agreed to work toward the following goal(s) to improve his/her overall diabetes control.      Care Plan: Diabetes Management   Updates made since 3/13/2023 12:00 AM        Problem: Healthy Eating         Goal: Eat 3 meals daily with </= 45g of Carbohydrate per meal & </= 15g carbohydrate per snack. Completed 4/12/2023   Start Date: 1/27/2023   Expected End Date: 4/5/2023   This Visit's Progress: Met   Recent Progress: On track   Priority: High    Note:    1/27/23: Reviewed sources of carbohydrate & appropriate portion sizes.  Discussed plate method for diabetes as well as carbohydrate counting.  Encouraged pt to read nutrition facts labels for serving size & grams of total carbohydrate.  Gave goal of </= 45g carbohydrate per meal & </= 15g carbohydrate per snack.  Pt c/o lack of self control when it comes to food cravings. Beverages consist mostly of unsweet tea/Coke Zero/water.  No regular soda x6 years.    3/1/23: Per food recall, pt seems to be staying </= 45g carbohydrate per meal but some snacks may slightly exceed 15g carbohydrate such as when pt consumes up to 1.5 cups diet juice.  Pt seems to be doing very well with portion control otherwise.  Recently increased daily work hours to 5 hours on days that she works vs 4 hours so she is able to take a break to eat around 11:30AM instead of waiting to eat lunch at 2PM or later.  Pt would like to continue focusing on improving diet.  Reports that she is not really carbohydrate counting but is making an effort to limit carbohydrates.    4/12/23: Per food recall, meals </= 45g carbohydrate & snacks </= 15g carbohydrate.  Pt does admit to making poor diet choices over the past 3 weeks after the deaths of 2 close friends but even pt's poor choices have remained within carbohydrate goals as reported.  Pt will be going on vacation next week.  Discussed strategies for choosing healthier menu options when dining out.       Task: Discussed strategies for choosing healthier menu options when dining out. Completed 4/12/2023          Follow Up Plan     Pt has attended 3 diabetes education classes with all topics covered & goals met.  Pt to call with questions or for for further follow ups prn.  Follow up if symptoms worsen or fail to improve.    Today's care plan and follow up schedule was discussed with patient.  Lois verbalized understanding of the care plan, goals, and agrees to follow up plan.        The  patient was encouraged to communicate with his/her health care provider/physician and care team regarding his/her condition(s) and treatment.  I provided the patient with my contact information today and encouraged to contact me via phone or [facility] Patient Portal as needed.     Length of Visit   Total Time: 60 Minutes

## 2023-06-02 ENCOUNTER — OFFICE VISIT (OUTPATIENT)
Dept: ENDOCRINOLOGY | Facility: CLINIC | Age: 73
End: 2023-06-02
Payer: MEDICARE

## 2023-06-02 VITALS
TEMPERATURE: 98 F | DIASTOLIC BLOOD PRESSURE: 76 MMHG | BODY MASS INDEX: 36.07 KG/M2 | HEIGHT: 62 IN | SYSTOLIC BLOOD PRESSURE: 135 MMHG | HEART RATE: 73 BPM | WEIGHT: 196 LBS

## 2023-06-02 DIAGNOSIS — M81.0 OSTEOPOROSIS, UNSPECIFIED OSTEOPOROSIS TYPE, UNSPECIFIED PATHOLOGICAL FRACTURE PRESENCE: ICD-10-CM

## 2023-06-02 DIAGNOSIS — Z79.4 TYPE 2 DIABETES MELLITUS WITH HYPERGLYCEMIA, WITH LONG-TERM CURRENT USE OF INSULIN: Primary | ICD-10-CM

## 2023-06-02 DIAGNOSIS — E11.65 TYPE 2 DIABETES MELLITUS WITH HYPERGLYCEMIA, WITH LONG-TERM CURRENT USE OF INSULIN: Primary | ICD-10-CM

## 2023-06-02 DIAGNOSIS — E83.52 HYPERCALCEMIA: ICD-10-CM

## 2023-06-02 LAB
ALBUMIN SERPL-MCNC: 3.9 G/DL (ref 3.4–4.8)
BUN SERPL-MCNC: 16.7 MG/DL (ref 9.8–20.1)
CALCIUM SERPL-MCNC: 10.5 MG/DL (ref 8.4–10.2)
CHLORIDE SERPL-SCNC: 107 MMOL/L (ref 98–107)
CO2 SERPL-SCNC: 29 MMOL/L (ref 23–31)
CREAT SERPL-MCNC: 0.75 MG/DL (ref 0.55–1.02)
GFR SERPLBLD CREATININE-BSD FMLA CKD-EPI: >60 MLS/MIN/1.73/M2
GLUCOSE SERPL-MCNC: 88 MG/DL (ref 82–115)
HBA1C MFR BLD: 8.4 %
PHOSPHATE SERPL-MCNC: 3 MG/DL (ref 2.3–4.7)
POTASSIUM SERPL-SCNC: 4.1 MMOL/L (ref 3.5–5.1)
PTH-INTACT SERPL-MCNC: 77.4 PG/ML (ref 8.7–77)
SODIUM SERPL-SCNC: 145 MMOL/L (ref 136–145)

## 2023-06-02 PROCEDURE — 36415 COLL VENOUS BLD VENIPUNCTURE: CPT

## 2023-06-02 PROCEDURE — 99214 OFFICE O/P EST MOD 30 MIN: CPT | Mod: PBBFAC

## 2023-06-02 PROCEDURE — 80069 RENAL FUNCTION PANEL: CPT

## 2023-06-02 PROCEDURE — 83036 HEMOGLOBIN GLYCOSYLATED A1C: CPT | Mod: PBBFAC

## 2023-06-02 PROCEDURE — 83970 ASSAY OF PARATHORMONE: CPT

## 2023-06-02 RX ORDER — LIRAGLUTIDE 6 MG/ML
0.6 INJECTION SUBCUTANEOUS DAILY
Qty: 3 ML | Refills: 11 | Status: SHIPPED | OUTPATIENT
Start: 2023-06-02 | End: 2023-12-06 | Stop reason: SDUPTHER

## 2023-06-02 RX ORDER — ALENDRONATE SODIUM 70 MG/1
70 TABLET ORAL
Qty: 4 TABLET | Refills: 11 | Status: SHIPPED | OUTPATIENT
Start: 2023-06-02 | End: 2023-12-06

## 2023-06-02 RX ORDER — LANOLIN ALCOHOL/MO/W.PET/CERES
100 CREAM (GRAM) TOPICAL DAILY
COMMUNITY

## 2023-06-02 RX ORDER — LIRAGLUTIDE 6 MG/ML
0.6 INJECTION SUBCUTANEOUS DAILY
Qty: 3 ML | Refills: 11 | Status: SHIPPED | OUTPATIENT
Start: 2023-06-02 | End: 2023-06-02

## 2023-06-02 RX ORDER — IBUPROFEN 100 MG/5ML
1000 SUSPENSION, ORAL (FINAL DOSE FORM) ORAL DAILY
COMMUNITY

## 2023-06-02 RX ORDER — CHOLECALCIFEROL (VITAMIN D3) 25 MCG
1000 TABLET ORAL DAILY
COMMUNITY

## 2023-06-02 RX ORDER — ZINC GLUCONATE 50 MG
TABLET ORAL DAILY
COMMUNITY

## 2023-06-02 RX ORDER — AMOXICILLIN 500 MG
CAPSULE ORAL DAILY
COMMUNITY

## 2023-06-02 NOTE — PROGRESS NOTES
Endocrinology Clinic Note    Attending: Gerson Rust MD    Intern: Esvin Gates MD    Chief complaint:  Referral for diabetes mellitus    HPI:  72-year-old female with a past medical history of diabetes mellitus type 2, hypertension, hyperlipidemia, and mitral valve prolapse?  Who presents to the endocrinology clinic as a referral for diabetic management.  She presents to clinic for follow up.     ROS:  ROS      Physical Exam  There were no vitals filed for this visit.    Physical Exam      Assessment and plan:  Uncontrolled diabetes mellitus type 2  -Currently on Tresiba 30u qd, actos 30mg qd, glucophage 1000mg BID, amaryl 4mg qd, and trulicity 0.75mg qwk      Hypertension   -F/u PCP    Hyperlipidemia  -F/u PCP    Disposition:  Return to clinic in ***    Bear Clark,   Internal Medicine - PGY-2

## 2023-06-02 NOTE — PROGRESS NOTES
Endocrinology Clinic Note    Patient Name: Lois Gaitan  BRIANB: 1950   MRN: [id number]  Date: 2023  Home Address: [address] [place] [id number]      Subjective:     Chief Complaint:   Chief Complaint   Patient presents with    Diabetes         HPI:  72-year-old female with a past medical history of diabetes mellitus type 2, hypertension, hyperlipidemia, and mitral valve prolapse.    Since last visit she reports taking her current medications as listed below. However,she is still only taking Tresiba PRN if her blood glucose is high in the morning. Nonetheless, she does report that her blood sugars in the morning are on average 130-140 with some readings in the 90s. She also reports that she is trying to fix her diet but still has some work to do. Moreover, it appears she can no longer afford Trulicity as she is being charged $230 per pen.     Available notes and lab results since last visit were reviewed: Has Osteoporeosis per DEXA. A1c 8.4 (prev 9.2)  Current Diabetes Regimen: Tresiba 30u qd, actos 30mg qd, glucophage 1000mg BID, amaryl 4mg qd, and trulicity 0.75mg qwk    Past Medical History:   Diagnosis Date    Diabetes mellitus, type 2     Hyperlipidemia     Hypertension     Unspecified osteoarthritis, unspecified site         Past Surgical History:   Procedure Laterality Date    APPENDECTOMY       SECTION      1970    HYSTERECTOMY      KNEE SURGERY      ,       Allergy:  Review of patient's allergies indicates:   Allergen Reactions    Penicillins Rash        Current Medications:    Current Outpatient Medications:     dulaglutide (TRULICITY) 0.75 mg/0.5 mL pen injector, Inject 0.75 mg into the skin every 7 days., Disp: 4 pen, Rfl: 11    glimepiride (AMARYL) 4 MG tablet, Take 4 mg by mouth 2 (two) times daily., Disp: , Rfl:     lisinopriL-hydrochlorothiazide (PRINZIDE,ZESTORETIC) 20-25 mg Tab, Take 1 tablet by mouth., Disp: , Rfl:     metFORMIN (GLUCOPHAGE) 1000 MG  "tablet, Take 1,000 mg by mouth 2 (two) times daily., Disp: , Rfl:     metoprolol succinate (TOPROL-XL) 50 MG 24 hr tablet, Take 50 mg by mouth., Disp: , Rfl:     pioglitazone (ACTOS) 30 MG tablet, Take 30 mg by mouth., Disp: , Rfl:     pravastatin (PRAVACHOL) 80 MG tablet, Take 80 mg by mouth every evening., Disp: , Rfl:     TRESIBA FLEXTOUCH U-200 200 unit/mL (3 mL) insulin pen, Inject 30 Units into the skin once daily., Disp: , Rfl:      Social History:   reports that she has never smoked. She has never used smokeless tobacco. She reports that she does not currently use alcohol. She reports that she does not use drugs.     Family History   Problem Relation Age of Onset    Heart disease Father     Heart disease Paternal Aunt           There is no immunization history on file for this patient.    Review of Systems   Constitutional:  Negative for chills and fever.   Respiratory:  Negative for cough and shortness of breath.    Cardiovascular:  Negative for chest pain.   Gastrointestinal:  Negative for abdominal pain and vomiting.   Genitourinary:  Negative for frequency.   Musculoskeletal:  Negative for myalgias.     Objective:      Physical Exam  Vitals:    06/02/23 0739   Weight: 88.9 kg (196 lb)   Height: 5' 2" (1.575 m)        Wt Readings from Last 3 Encounters:   06/02/23 88.9 kg (196 lb)   04/12/23 88 kg (194 lb)   03/01/23 88.2 kg (194 lb 6.4 oz)       Physical Exam  Vitals and nursing note reviewed.   Constitutional:       General: She is not in acute distress.     Appearance: Normal appearance. She is normal weight.   HENT:      Head: Normocephalic and atraumatic.      Mouth/Throat:      Mouth: Mucous membranes are moist.      Pharynx: Oropharynx is clear.   Eyes:      Extraocular Movements: Extraocular movements intact.      Pupils: Pupils are equal, round, and reactive to light.   Pulmonary:      Effort: Pulmonary effort is normal.   Musculoskeletal:         General: No swelling.      Right lower leg: No " edema.      Left lower leg: No edema.   Skin:     General: Skin is warm and dry.   Neurological:      General: No focal deficit present.      Mental Status: She is alert and oriented to person, place, and time.   Psychiatric:         Mood and Affect: Mood normal.         Behavior: Behavior normal.        Body mass index is 35.85 kg/m².      Office Visit on 01/12/2023   Component Date Value Ref Range Status    Hemoglobin A1C, POC 01/12/2023 9.2  % Final    Urine Microalbumin 01/12/2023 7.0  <=30.0 ug/ml Final    Urine Creatinine 01/12/2023 62.5  47.0 - 110.0 mg/dL Final    Microalbumin Creatinine Ratio 01/12/2023 11.2  0.0 - 30.0 mg/gm Cr Final    Thyroid Stimulating Hormone 01/12/2023 1.422  0.350 - 4.940 uIU/mL Final    C-Peptide, S 01/12/2023 1.5  1.1 - 4.4 ng/mL Final       Test Performed by:  [facility]  [address] [place] [address]  : Guillermo Polk M.D. Ph.D.; CLIA# [id number]    Cholesterol Total 01/12/2023 185  <=200 mg/dL Final    HDL Cholesterol 01/12/2023 63 (H)  35 - 60 mg/dL Final    Triglyceride 01/12/2023 154 (H)  37 - 140 mg/dL Final    Cholesterol/HDL Ratio 01/12/2023 3  0 - 5 Final    Very Low Density Lipoprotein 01/12/2023 31   Final    LDL Cholesterol 01/12/2023 91.00  50.00 - 140.00 mg/dL Final    Sodium Level 01/12/2023 142  136 - 145 mmol/L Final    Potassium Level 01/12/2023 3.8  3.5 - 5.1 mmol/L Final    Chloride 01/12/2023 103  98 - 107 mmol/L Final    Carbon Dioxide 01/12/2023 29  23 - 31 mmol/L Final    Glucose Level 01/12/2023 206 (H)  82 - 115 mg/dL Final    Blood Urea Nitrogen 01/12/2023 20.3 (H)  9.8 - 20.1 mg/dL Final    Creatinine 01/12/2023 0.89  0.55 - 1.02 mg/dL Final    Calcium Level Total 01/12/2023 10.3 (H)  8.4 - 10.2 mg/dL Final    Protein Total 01/12/2023 7.0  5.8 - 7.6 gm/dL Final    Albumin Level 01/12/2023 4.2  3.4 - 4.8 g/dL Final    Globulin 01/12/2023 2.8  2.4 - 3.5 gm/dL Final    Albumin/Globulin  Ratio 01/12/2023 1.5  1.1 - 2.0 ratio Final    Bilirubin Total 01/12/2023 0.7  <=1.5 mg/dL Final    Alkaline Phosphatase 01/12/2023 92  40 - 150 unit/L Final    Alanine Aminotransferase 01/12/2023 18  0 - 55 unit/L Final    Aspartate Aminotransferase 01/12/2023 16  5 - 34 unit/L Final    eGFR 01/12/2023 >60  mls/min/1.73/m2 Final    Vit D 25 OH 01/12/2023 39.6  30.0 - 80.0 ng/mL Final    Urine Calcium 01/12/2023 7.4  mg/dL Final           Assessment:   Uncontrolled DM type II  Hypercalcemia  Osteoporeosis    Plan  D/c Trulicity s/t cost. Start Victoza 0.6mg daily for 2 weeks then 1.2mg therafter  Repeat PTH and renal function panel  Start taking alendronate 70mg every week on a empty stomach with full glass of water      Disposition: RTC in 3mo    Bear Clark DO  Internal Medicine - PGY-2

## 2023-06-11 ENCOUNTER — TELEPHONE (OUTPATIENT)
Dept: ENDOCRINOLOGY | Facility: CLINIC | Age: 73
End: 2023-06-11
Payer: MEDICARE

## 2023-06-11 NOTE — TELEPHONE ENCOUNTER
Labs are consistent w/ her having primary hyperparathyroidism.  Options are to either further discuss mgmt next visit, or if she is open to consider surgery get an u/s and sestimibi scan and we can have that back next visit to further discuss the next step.

## 2023-12-06 ENCOUNTER — OFFICE VISIT (OUTPATIENT)
Dept: ENDOCRINOLOGY | Facility: CLINIC | Age: 73
End: 2023-12-06
Payer: MEDICARE

## 2023-12-06 VITALS
DIASTOLIC BLOOD PRESSURE: 84 MMHG | RESPIRATION RATE: 15 BRPM | HEART RATE: 89 BPM | HEIGHT: 62 IN | TEMPERATURE: 97 F | BODY MASS INDEX: 37.36 KG/M2 | WEIGHT: 203 LBS | SYSTOLIC BLOOD PRESSURE: 148 MMHG

## 2023-12-06 DIAGNOSIS — E11.65 TYPE 2 DIABETES MELLITUS WITH HYPERGLYCEMIA, WITH LONG-TERM CURRENT USE OF INSULIN: Primary | ICD-10-CM

## 2023-12-06 DIAGNOSIS — Z79.4 TYPE 2 DIABETES MELLITUS WITH HYPERGLYCEMIA, WITH LONG-TERM CURRENT USE OF INSULIN: Primary | ICD-10-CM

## 2023-12-06 DIAGNOSIS — E21.0 PRIMARY HYPERPARATHYROIDISM: ICD-10-CM

## 2023-12-06 DIAGNOSIS — M81.0 OSTEOPOROSIS, UNSPECIFIED OSTEOPOROSIS TYPE, UNSPECIFIED PATHOLOGICAL FRACTURE PRESENCE: ICD-10-CM

## 2023-12-06 LAB — HBA1C MFR BLD: 9.8 %

## 2023-12-06 PROCEDURE — 83036 HEMOGLOBIN GLYCOSYLATED A1C: CPT | Mod: PBBFAC

## 2023-12-06 PROCEDURE — 96372 THER/PROPH/DIAG INJ SC/IM: CPT | Mod: PBBFAC

## 2023-12-06 PROCEDURE — 99213 OFFICE O/P EST LOW 20 MIN: CPT | Mod: PBBFAC,25

## 2023-12-06 RX ORDER — LIRAGLUTIDE 6 MG/ML
1.2 INJECTION SUBCUTANEOUS DAILY
Qty: 6 ML | Refills: 11 | Status: SHIPPED | OUTPATIENT
Start: 2023-12-06 | End: 2024-03-22 | Stop reason: ALTCHOICE

## 2023-12-06 RX ORDER — INSULIN DEGLUDEC 200 U/ML
46 INJECTION, SOLUTION SUBCUTANEOUS DAILY
Qty: 3 PEN | Refills: 11 | Status: SHIPPED | OUTPATIENT
Start: 2023-12-06 | End: 2024-03-22 | Stop reason: SDUPTHER

## 2023-12-06 RX ADMIN — DENOSUMAB 60 MG: 60 INJECTION SUBCUTANEOUS at 03:12

## 2023-12-06 NOTE — PROGRESS NOTES
Endocrinology Clinic Note    Patient Name: Lois Gaitan  BRIANB: 1950   MRN: [id number]  Date: 12/06/2023  Home Address: [address] [place], [place] [address] [address]      Subjective:     Chief Complaint:   Chief Complaint   Patient presents with    Diabetes         HPI:  72-year-old female with a past medical history of diabetes mellitus type 2, hypertension, hyperlipidemia, and mitral valve prolapse who presents to [facility] endocrinology clinic for follow up.  Patient previously seen (06/2023).  At last appointment; patient was discontinued from Trulicity secondary to cost issues initiated on Victoza 0.6 mg q.d..  Since this time; patient states that she did not initiate Victoza until proximally 2 months later though has been compliant since with today being her 10th dose.  We discussed titrating to 1.2 mg daily in which she is amenable.  Since last appointment, she discontinued taking metformin secondary to reading poor side effects and advice from medical related family members.     She reports taking morning CBG readings which dictate the amount of units per insulin utilized.  Current regimen ( or higher, she takes 60 units Tresiba) (-160, she takes 45 units Tresiba) (CBG <140, she takes no insulin). Is currently on taking blood sugar checks in a.m..  Over the past x6 months she reports cheating and eating candies for sometimes up to 1 week consistently, approximately 10 times per month, with CBG highs of upper 300s which he states scares her, stops eating candy, and becomes more compliant with medication.    At last office visit, she was also prescribed alendronate 7 mg weekly though stated she is not taken again due to medical personnel and family that recommended against that.  Today in clinic we discussed other treatment options for osteoporosis in the setting of primary hyperparathyroidism which she is amenable with moving forward with q.6 month Prolia injections.    She is asking fo  Dexcom though we discussed with her only taking CBG logs x1 per day discontinued to be increased to x3 occasions per day at follow up appointment tele for insurance approval.  Provided CBG logs today.    Otherwise, patient has no acute complaints patient denies any headache, acute changes in vision, chest pain, shortness for breath, palpitations, diarrhea/constipation, nausea/vomiting.  She does have increased polyuria polydipsia over the past x1 month.  Denies any hypoglycemic/hypoglycemic episodes requiring hospitalization.    Past Medical History:   Diagnosis Date    Diabetes mellitus, type 2     Hyperlipidemia     Hypertension     Unspecified osteoarthritis, unspecified site         Past Surgical History:   Procedure Laterality Date    APPENDECTOMY       SECTION      1970    HYSTERECTOMY      KNEE SURGERY      ,       Allergy:  Review of patient's allergies indicates:   Allergen Reactions    Penicillins Rash        Current Medications:    Current Outpatient Medications:     ascorbic acid, vitamin C, (VITAMIN C) 1000 MG tablet, Take 1,000 mg by mouth once daily., Disp: , Rfl:     cyanocobalamin (VITAMIN B-12) 1000 MCG tablet, Take 100 mcg by mouth once daily., Disp: , Rfl:     glimepiride (AMARYL) 4 MG tablet, Take 4 mg by mouth 2 (two) times daily., Disp: , Rfl:     liraglutide 0.6 mg/0.1 mL, 18 mg/3 mL, subq PNIJ (VICTOZA 3-YUSEF) 0.6 mg/0.1 mL (18 mg/3 mL) PnIj pen, Inject 0.6 mg into the skin once daily. 0.6mg into skin daily for 2 weeks. 1.2mg into skin daily thereafter., Disp: 3 mL, Rfl: 11    lisinopriL-hydrochlorothiazide (PRINZIDE,ZESTORETIC) 20-25 mg Tab, Take 1 tablet by mouth., Disp: , Rfl:     metoprolol succinate (TOPROL-XL) 50 MG 24 hr tablet, Take 50 mg by mouth., Disp: , Rfl:     omega-3 fatty acids/fish oil (FISH OIL-OMEGA-3 FATTY ACIDS) 300-1,000 mg capsule, Take by mouth once daily., Disp: , Rfl:     pioglitazone (ACTOS) 30 MG tablet, Take 30 mg by mouth., Disp: , Rfl:      "pravastatin (PRAVACHOL) 80 MG tablet, Take 80 mg by mouth every evening., Disp: , Rfl:     TRESIBA FLEXTOUCH U-200 200 unit/mL (3 mL) insulin pen, Inject 35 Units into the skin once daily., Disp: , Rfl:     vitamin D (VITAMIN D3) 1000 units Tab, Take 1,000 Units by mouth once daily., Disp: , Rfl:     zinc gluconate 50 mg tablet, Take by mouth once daily., Disp: , Rfl:     alendronate (FOSAMAX) 70 MG tablet, Take 1 tablet (70 mg total) by mouth every 7 days. Take on empty stomach with full glass of water once a week (Patient not taking: Reported on 12/6/2023), Disp: 4 tablet, Rfl: 11    metFORMIN (GLUCOPHAGE) 1000 MG tablet, Take 1,000 mg by mouth 2 (two) times daily., Disp: , Rfl:      Social History:   reports that she has never smoked. She has never used smokeless tobacco. She reports that she does not currently use alcohol. She reports that she does not use drugs.     Family History   Problem Relation Age of Onset    Heart disease Father     Heart disease Paternal Aunt           There is no immunization history on file for this patient.    Review of Systems   Constitutional:  Negative for chills and fever.   Respiratory:  Negative for cough and shortness of breath.    Cardiovascular:  Negative for chest pain.   Gastrointestinal:  Negative for abdominal pain and vomiting.   Genitourinary:  Negative for frequency.   Musculoskeletal:  Negative for myalgias.       Objective:      Physical Exam  Vitals:    12/06/23 1355   BP: (!) 148/84   BP Location: Right arm   Patient Position: Sitting   BP Method: Medium (Automatic)   Pulse: 89   Resp: 15   Temp: 97.4 °F (36.3 °C)   Weight: 92.1 kg (203 lb)   Height: 5' 2" (1.575 m)        Wt Readings from Last 3 Encounters:   12/06/23 92.1 kg (203 lb)   06/02/23 88.9 kg (196 lb)   04/12/23 88 kg (194 lb)       Physical Exam  Vitals and nursing note reviewed.   Constitutional:       General: She is not in acute distress.     Appearance: Normal appearance. She is normal weight. "   HENT:      Head: Normocephalic and atraumatic.      Mouth/Throat:      Mouth: Mucous membranes are moist.      Pharynx: Oropharynx is clear.   Eyes:      Extraocular Movements: Extraocular movements intact.      Pupils: Pupils are equal, round, and reactive to light.   Pulmonary:      Effort: Pulmonary effort is normal.   Musculoskeletal:         General: No swelling.      Right lower leg: No edema.      Left lower leg: No edema.   Skin:     General: Skin is warm and dry.   Neurological:      General: No focal deficit present.      Mental Status: She is alert and oriented to person, place, and time.   Psychiatric:         Mood and Affect: Mood normal.         Behavior: Behavior normal.        Protective Sensation (w/ 10 gram monofilament):  Right: Intact  Left: Intact    Visual Inspection:  Normal -  Bilateral    Pedal Pulses:   Right: Present  Left: Present    Posterior Tibialis Pulses:   Right:Present  Left: Present      Body mass index is 37.13 kg/m².      Office Visit on 12/06/2023   Component Date Value Ref Range Status    Hemoglobin A1C, POC 12/06/2023 9.8  % Final           Assessment:   1) DM type II - poorly controlled  2) Primary Hyperparathyroidism  3) Hypercalcemia; likely 2/2 #2  4) Osteoporosis; suspected to be 2/2 to #2    Plan  - C/w Tresiba, though at consistent qAM dosing of 45u at this time  - Counseled regarding close CBG log monitoring, x3 occasions/day for appropriate medication titration & possible acquisition of Dexcom in future  - C/w Victoza though at increased dose, from 0.6mg q.D. to 1.2 mg q.D.  - C/w Amaryl 4mg BID & Actos 30mg qD  - Pt self-discontinued metformin 1000 mg b.i.d. after previous office visit; continue discontinuation at this time  - patient failed alendronate though did not take any doses 2/2 family recommendations & reading side effects online  - discuss additional treatment options and potential side effects regarding Prolia (osteonecrosis of jaw, atypical femur  fracture though at low risk); patient is agreeable with moving forward with treatment at this time, will administer 1st dose today in clinic   - also discussed possibility of further primary primary hyperparathyroidism workup including ultrasound parathyroid and sestamibi scan with potential progression to surgery if significant; patient states would like to complete on moving forward with imaging at this time      Disposition: RTC in 3-4 months    Gunner Gatica MD  Internal Medicine PGY-III       Presence Of Scar Tissue (For Histology): present

## 2023-12-06 NOTE — PROGRESS NOTES
Administered denosumab (prolia) 60mg subcutaneous to back of right arm. Pt tolerated well.  Next dose scheduled 6months + 1 day.

## 2024-03-22 ENCOUNTER — TELEPHONE (OUTPATIENT)
Dept: ENDOCRINOLOGY | Facility: CLINIC | Age: 74
End: 2024-03-22
Payer: MEDICARE

## 2024-03-22 ENCOUNTER — OFFICE VISIT (OUTPATIENT)
Dept: ENDOCRINOLOGY | Facility: CLINIC | Age: 74
End: 2024-03-22
Payer: MEDICARE

## 2024-03-22 VITALS
TEMPERATURE: 98 F | RESPIRATION RATE: 19 BRPM | DIASTOLIC BLOOD PRESSURE: 84 MMHG | HEART RATE: 87 BPM | BODY MASS INDEX: 38.14 KG/M2 | WEIGHT: 207.25 LBS | HEIGHT: 62 IN | SYSTOLIC BLOOD PRESSURE: 139 MMHG

## 2024-03-22 DIAGNOSIS — E11.65 TYPE 2 DIABETES MELLITUS WITH HYPERGLYCEMIA, WITH LONG-TERM CURRENT USE OF INSULIN: Primary | ICD-10-CM

## 2024-03-22 DIAGNOSIS — Z79.4 TYPE 2 DIABETES MELLITUS WITH HYPERGLYCEMIA, WITH LONG-TERM CURRENT USE OF INSULIN: Primary | ICD-10-CM

## 2024-03-22 DIAGNOSIS — E21.0 PRIMARY HYPERPARATHYROIDISM: ICD-10-CM

## 2024-03-22 DIAGNOSIS — M81.0 OSTEOPOROSIS, UNSPECIFIED OSTEOPOROSIS TYPE, UNSPECIFIED PATHOLOGICAL FRACTURE PRESENCE: ICD-10-CM

## 2024-03-22 LAB
ALBUMIN SERPL-MCNC: 4.1 G/DL (ref 3.4–4.8)
ALBUMIN/GLOB SERPL: 1.2 RATIO (ref 1.1–2)
ALP SERPL-CCNC: 66 UNIT/L (ref 40–150)
ALT SERPL-CCNC: 15 UNIT/L (ref 0–55)
AST SERPL-CCNC: 14 UNIT/L (ref 5–34)
BILIRUB SERPL-MCNC: 0.6 MG/DL
BUN SERPL-MCNC: 20.1 MG/DL (ref 9.8–20.1)
CALCIUM SERPL-MCNC: 10.8 MG/DL (ref 8.4–10.2)
CHLORIDE SERPL-SCNC: 104 MMOL/L (ref 98–107)
CHOLEST SERPL-MCNC: 228 MG/DL
CHOLEST/HDLC SERPL: 4 {RATIO} (ref 0–5)
CO2 SERPL-SCNC: 31 MMOL/L (ref 23–31)
CREAT SERPL-MCNC: 0.95 MG/DL (ref 0.55–1.02)
CREAT UR-MCNC: 160.9 MG/DL (ref 45–106)
DEPRECATED CALCIDIOL+CALCIFEROL SERPL-MC: 40.4 NG/ML (ref 30–80)
GFR SERPLBLD CREATININE-BSD FMLA CKD-EPI: >60 MLS/MIN/1.73/M2
GLOBULIN SER-MCNC: 3.5 GM/DL (ref 2.4–3.5)
GLUCOSE SERPL-MCNC: 107 MG/DL (ref 82–115)
HDLC SERPL-MCNC: 62 MG/DL (ref 35–60)
LDLC SERPL CALC-MCNC: 115 MG/DL (ref 50–140)
MICROALBUMIN UR-MCNC: 10.6 UG/ML
MICROALBUMIN/CREAT RATIO PNL UR: 6.6 MG/GM CR (ref 0–30)
POTASSIUM SERPL-SCNC: 3.9 MMOL/L (ref 3.5–5.1)
PROT SERPL-MCNC: 7.6 GM/DL (ref 5.8–7.6)
SODIUM SERPL-SCNC: 144 MMOL/L (ref 136–145)
TRIGL SERPL-MCNC: 255 MG/DL (ref 37–140)
VLDLC SERPL CALC-MCNC: 51 MG/DL

## 2024-03-22 PROCEDURE — 82043 UR ALBUMIN QUANTITATIVE: CPT

## 2024-03-22 PROCEDURE — 80053 COMPREHEN METABOLIC PANEL: CPT

## 2024-03-22 PROCEDURE — 36415 COLL VENOUS BLD VENIPUNCTURE: CPT

## 2024-03-22 PROCEDURE — 82306 VITAMIN D 25 HYDROXY: CPT

## 2024-03-22 PROCEDURE — 80061 LIPID PANEL: CPT

## 2024-03-22 PROCEDURE — 99214 OFFICE O/P EST MOD 30 MIN: CPT | Mod: PBBFAC

## 2024-03-22 RX ORDER — INSULIN DEGLUDEC 200 U/ML
50 INJECTION, SOLUTION SUBCUTANEOUS DAILY
Qty: 3 PEN | Refills: 6 | Status: SHIPPED | OUTPATIENT
Start: 2024-03-22

## 2024-03-22 NOTE — TELEPHONE ENCOUNTER
Labs are back.  The calcium is consistent w/ her primary hyperparathyroidism.  The lipids suggest she was non fasting for the test but that is ok as it was done to make sure they were in a safe range.  Proceed with recommendations from recent visit and can further discuss labs at next visit. If she has recently had a true fasting lipid panel done with her PCP w/i the past year, please bring a copy to f/u.

## 2024-03-22 NOTE — PROGRESS NOTES
Endocrinology Clinic Note    Patient Name: Lois Gaitan  BRIANB: 1950   MRN: [id number]  Date: 2024  Home Address: [address] [place] [id number]      Subjective:     Chief Complaint:   Chief Complaint   Patient presents with    Diabetes         HPI:  72-year-old female with a past medical history of diabetes mellitus type 2, hypertension, hyperlipidemia, and mitral valve prolapse who presents to [facility] endocrinology clinic for follow up.      She reports taking morning CBG readings which dictate the amount of units per insulin utilized.  Current regimen ( or higher, she takes 60 units Tresiba) (-160, she takes 45 units Tresiba) (CBG <140, she takes no insulin).    Patient has brought her BP log today. Reviewing her log, patient tends to use 60 units only when CBG > 200. She has also started weight watchers for last month with fluctuation of her weight. She has been adherent to her Victoza 1.2 mg daily, along with Amaryl 4 mg once daily and Actos 30 mg twice daily     Otherwise, patient has no acute complaints patient denies any headache, acute changes in vision, chest pain, shortness for breath, palpitations, diarrhea, nausea/vomiting. Denies numbness/tingling, lightheadedness or syncope. She continue to have polyuria, but her polydipsia has improved.  Denies any hypoglycemic/hypoglycemic episodes requiring hospitalization.    Past Medical History:   Diagnosis Date    Diabetes mellitus, type 2     Hyperlipidemia     Hypertension     Unspecified osteoarthritis, unspecified site         Past Surgical History:   Procedure Laterality Date    APPENDECTOMY       SECTION      1970    HYSTERECTOMY      KNEE SURGERY      ,       Allergy:  Review of patient's allergies indicates:   Allergen Reactions    Penicillins Rash        Current Medications:    Current Outpatient Medications:     ascorbic acid, vitamin C, (VITAMIN C) 1000 MG tablet, Take 1,000 mg by mouth once  daily., Disp: , Rfl:     cyanocobalamin (VITAMIN B-12) 1000 MCG tablet, Take 100 mcg by mouth once daily., Disp: , Rfl:     glimepiride (AMARYL) 4 MG tablet, Take 4 mg by mouth 2 (two) times daily., Disp: , Rfl:     liraglutide 0.6 mg/0.1 mL, 18 mg/3 mL, subq PNIJ (VICTOZA 3-YUSEF) 0.6 mg/0.1 mL (18 mg/3 mL) PnIj pen, Inject 1.2 mg into the skin once daily. 0.6mg into skin daily for 2 weeks. 1.2mg into skin daily thereafter., Disp: 6 mL, Rfl: 11    lisinopriL-hydrochlorothiazide (PRINZIDE,ZESTORETIC) 20-25 mg Tab, Take 1 tablet by mouth., Disp: , Rfl:     metoprolol succinate (TOPROL-XL) 50 MG 24 hr tablet, Take 50 mg by mouth., Disp: , Rfl:     omega-3 fatty acids/fish oil (FISH OIL-OMEGA-3 FATTY ACIDS) 300-1,000 mg capsule, Take by mouth once daily., Disp: , Rfl:     pioglitazone (ACTOS) 30 MG tablet, Take 30 mg by mouth., Disp: , Rfl:     pravastatin (PRAVACHOL) 80 MG tablet, Take 80 mg by mouth every evening., Disp: , Rfl:     TRESIBA FLEXTOUCH U-200 200 unit/mL (3 mL) insulin pen, Inject 46 Units into the skin once daily., Disp: 3 pen , Rfl: 11    vitamin D (VITAMIN D3) 1000 units Tab, Take 1,000 Units by mouth once daily., Disp: , Rfl:     zinc gluconate 50 mg tablet, Take by mouth once daily., Disp: , Rfl:      Social History:   reports that she has never smoked. She has never used smokeless tobacco. She reports that she does not currently use alcohol. She reports that she does not use drugs.     Family History   Problem Relation Age of Onset    Heart disease Father     Heart disease Paternal Aunt           There is no immunization history on file for this patient.    Review of Systems   Constitutional:  Negative for chills and fever.   Respiratory:  Negative for cough and shortness of breath.    Cardiovascular:  Negative for chest pain.   Gastrointestinal:  Negative for abdominal pain and vomiting.   Genitourinary:  Negative for frequency.   Musculoskeletal:  Negative for myalgias.       Objective:     "  Physical Exam  Vitals:    03/22/24 0943   BP: 139/84   BP Location: Left arm   Patient Position: Sitting   BP Method: Medium (Automatic)   Pulse: 87   Resp: 19   Temp: 98 °F (36.7 °C)   Weight: 94 kg (207 lb 3.7 oz)   Height: 5' 2" (1.575 m)        Wt Readings from Last 3 Encounters:   03/22/24 94 kg (207 lb 3.7 oz)   12/06/23 92.1 kg (203 lb)   06/02/23 88.9 kg (196 lb)       Physical Exam  Vitals and nursing note reviewed.   Constitutional:       General: She is not in acute distress.     Appearance: Normal appearance. She is normal weight.   HENT:      Head: Normocephalic and atraumatic.      Mouth/Throat:      Mouth: Mucous membranes are moist.      Pharynx: Oropharynx is clear.   Eyes:      Extraocular Movements: Extraocular movements intact.      Pupils: Pupils are equal, round, and reactive to light.   Cardiovascular:      Rate and Rhythm: Normal rate and regular rhythm.      Pulses: Normal pulses.   Pulmonary:      Effort: Pulmonary effort is normal. No respiratory distress.      Breath sounds: No wheezing.   Abdominal:      General: There is no distension.      Palpations: Abdomen is soft.      Tenderness: There is no abdominal tenderness.   Musculoskeletal:         General: No swelling.      Right lower leg: No edema.      Left lower leg: No edema.   Skin:     General: Skin is warm and dry.   Neurological:      General: No focal deficit present.      Mental Status: She is alert and oriented to person, place, and time.   Psychiatric:         Mood and Affect: Mood normal.         Behavior: Behavior normal.         Body mass index is 37.9 kg/m².      Office Visit on 12/06/2023   Component Date Value Ref Range Status    Hemoglobin A1C, POC 12/06/2023 9.8  % Final           Assessment:   1) DM type II - poorly controlled  2) Primary Hyperparathyroidism  3) Hypercalcemia; likely 2/2 #2  4) Osteoporosis; suspected to be 2/2 to #2    Plan  - POCT A1c at 9.5% today  - Discussed starting scheduled Tresiba at 50 " units daily in the morning. Hold for CBG < 60  - Discussed in details about risks/benefits of Mounjaro. Patient expressed understanding. Will discontinue Victoza and start Mounjaro at 7.5 mg once every 7 days.   - C/w Amaryl 4mg BID & Actos 30mg qD  - patient against taking alendronate citing adverse effects  - Continue with Prolia injections and has upcoming nurse visit for Prolia in 06/10/2024  - also discussed workup for primary hyperparathyroidism including ultrasound parathyroid and sestamibi scan with potential progression to surgery if significant; patient continues want to monitor and defers any scan or surgical intervention at this time  - DM foot exam : completed last visit on 12/2023  - DM eye exam : last eye exam in Jan 2024 per patient, will get records      Labs ordered: CMP, lipid, vit D, microalbumin/creatinine ratio    RTC in 4 months    Jett Martinez  Internal Medicine - PGY-1

## 2024-03-25 NOTE — TELEPHONE ENCOUNTER
Spoke with patient informed her calcium level is consistent w/ her primary hyperparathyroidism, lipids suggest she was non fasting for the test but that is ok as it was done to make sure they were in a safe range. Dr. Rust wants her to proceed with recommendations from recent visit and can further discuss labs at next visit. Has not had a recent fasting lipid panel last one was about a year ago. No questions at this time.

## 2024-04-08 ENCOUNTER — TELEPHONE (OUTPATIENT)
Dept: ENDOCRINOLOGY | Facility: CLINIC | Age: 74
End: 2024-04-08
Payer: MEDICARE

## 2024-04-08 DIAGNOSIS — E11.65 TYPE 2 DIABETES MELLITUS WITH HYPERGLYCEMIA, WITH LONG-TERM CURRENT USE OF INSULIN: Primary | ICD-10-CM

## 2024-04-08 DIAGNOSIS — Z79.4 TYPE 2 DIABETES MELLITUS WITH HYPERGLYCEMIA, WITH LONG-TERM CURRENT USE OF INSULIN: Primary | ICD-10-CM

## 2024-04-08 NOTE — TELEPHONE ENCOUNTER
C/O diarrhea and headache for 2 days after starting mounjaro 7.5mg on 4/1/2024. Pt was wondering if she should have been started at the lower dose as this is a new rx for her.       Please advise

## 2024-04-09 NOTE — TELEPHONE ENCOUNTER
I called pt and informer her Dr Rust sent in rx for mounjaro 2.5mg weekly and will titrate dose from there    Pt verbalizes understanding

## 2024-04-22 RX ORDER — TIRZEPATIDE 2.5 MG/.5ML
2.5 INJECTION, SOLUTION SUBCUTANEOUS
Qty: 4 PEN | Refills: 0 | Status: SHIPPED | OUTPATIENT
Start: 2024-04-22 | End: 2024-05-28

## 2024-04-22 NOTE — TELEPHONE ENCOUNTER
Nirmala Story [facility] Endocrinology Clinical Support Staff  Caller: Unspecified (Today,  9:09 AM)  Pt of [place]      Pt called stating she called days ago to reduce her MOUNJARO dosage and was told a new prescription would be sent to her pharmacy.    Pt stated that it still wasn't sent.    Pt requesting a call back.    Pt number [phone]    Please advise

## 2024-05-28 RX ORDER — TIRZEPATIDE 5 MG/.5ML
INJECTION, SOLUTION SUBCUTANEOUS
Qty: 2 ML | Refills: 0 | Status: SHIPPED | OUTPATIENT
Start: 2024-05-28 | End: 2024-06-13

## 2024-05-28 NOTE — TELEPHONE ENCOUNTER
----- Message from Nirmala Story sent at 5/28/2024  1:24 PM CDT -----  Pt of [place]      Pt called stating she needs a refill on medication MOUNJARO.    Pt stated she needs a new prescription for 5 mg.    Pt stated its time she move up to the 5 mg because she been taking 2.5 for a while now.    Pt number [phone]      Please  advise

## 2024-06-10 ENCOUNTER — HOSPITAL ENCOUNTER (OUTPATIENT)
Dept: RADIOLOGY | Facility: HOSPITAL | Age: 74
Discharge: HOME | End: 2024-06-10
Attending: STUDENT IN AN ORGANIZED HEALTH CARE EDUCATION/TRAINING PROGRAM
Payer: MEDICARE

## 2024-06-10 DIAGNOSIS — Z12.31 ENCOUNTER FOR SCREENING MAMMOGRAM FOR BREAST CANCER: ICD-10-CM

## 2024-06-10 PROCEDURE — 77063 BREAST TOMOSYNTHESIS BI: CPT | Mod: 26,,, | Performed by: RADIOLOGY

## 2024-06-10 PROCEDURE — 77067 SCR MAMMO BI INCL CAD: CPT | Mod: 26,,, | Performed by: RADIOLOGY

## 2024-06-10 PROCEDURE — 77063 BREAST TOMOSYNTHESIS BI: CPT | Mod: TC

## 2024-06-13 ENCOUNTER — TELEPHONE (OUTPATIENT)
Dept: ENDOCRINOLOGY | Facility: CLINIC | Age: 74
End: 2024-06-13

## 2024-06-13 ENCOUNTER — OFFICE VISIT (OUTPATIENT)
Dept: ENDOCRINOLOGY | Facility: CLINIC | Age: 74
End: 2024-06-13
Payer: MEDICARE

## 2024-06-13 VITALS
RESPIRATION RATE: 16 BRPM | HEIGHT: 62 IN | WEIGHT: 205.81 LBS | BODY MASS INDEX: 37.87 KG/M2 | SYSTOLIC BLOOD PRESSURE: 137 MMHG | TEMPERATURE: 98 F | DIASTOLIC BLOOD PRESSURE: 89 MMHG | HEART RATE: 83 BPM

## 2024-06-13 DIAGNOSIS — M81.0 OSTEOPOROSIS, UNSPECIFIED OSTEOPOROSIS TYPE, UNSPECIFIED PATHOLOGICAL FRACTURE PRESENCE: ICD-10-CM

## 2024-06-13 DIAGNOSIS — E11.65 TYPE 2 DIABETES MELLITUS WITH HYPERGLYCEMIA, WITH LONG-TERM CURRENT USE OF INSULIN: Primary | ICD-10-CM

## 2024-06-13 DIAGNOSIS — Z79.4 TYPE 2 DIABETES MELLITUS WITH HYPERGLYCEMIA, WITH LONG-TERM CURRENT USE OF INSULIN: Primary | ICD-10-CM

## 2024-06-13 DIAGNOSIS — E21.0 PRIMARY HYPERPARATHYROIDISM: ICD-10-CM

## 2024-06-13 LAB — HBA1C MFR BLD: 8 %

## 2024-06-13 PROCEDURE — 83036 HEMOGLOBIN GLYCOSYLATED A1C: CPT | Mod: PBBFAC

## 2024-06-13 PROCEDURE — 96372 THER/PROPH/DIAG INJ SC/IM: CPT | Mod: PBBFAC

## 2024-06-13 PROCEDURE — 99214 OFFICE O/P EST MOD 30 MIN: CPT | Mod: PBBFAC,25

## 2024-06-13 RX ORDER — SUCRALFATE 1 G/10ML
SUSPENSION ORAL
COMMUNITY
Start: 2024-04-04

## 2024-06-13 RX ORDER — DENOSUMAB 60 MG/ML
60 INJECTION SUBCUTANEOUS
COMMUNITY

## 2024-06-13 RX ORDER — PANTOPRAZOLE SODIUM 20 MG/1
20 TABLET, DELAYED RELEASE ORAL
COMMUNITY
Start: 2024-04-04

## 2024-06-13 RX ORDER — DULAGLUTIDE 0.75 MG/.5ML
0.75 INJECTION, SOLUTION SUBCUTANEOUS
Qty: 4 PEN | Refills: 11 | Status: SHIPPED | OUTPATIENT
Start: 2024-06-13 | End: 2025-06-13

## 2024-06-13 RX ADMIN — DENOSUMAB 60 MG: 60 INJECTION SUBCUTANEOUS at 10:06

## 2024-06-13 NOTE — PROGRESS NOTES
Endocrinology Clinic Note    Patient Name: Lois Gaitan  BRIANB: 1950   MRN: [id number]  Date: 2024  Home Address: [address] [place], [place] [address] [id number]      Subjective:     Chief Complaint:   Chief Complaint   Patient presents with    Diabetes    Osteoporosis         HPI:  72-year-old female with a past medical history of diabetes mellitus type 2, hypertension, hyperlipidemia, and mitral valve prolapse who presents to [facility] endocrinology clinic for follow up.      Home CBG log 100s-210s averaging 150s. Takes Tresiba as needed in the morning if CBG over 120-130 (usually takes 40u, sometimes 50u). Reports constipation since starting Mounjaro, having BM every 2-3 days with miralax or stool softener. Originally ordered 7.5 mg. Took once but pharmacist son-in-law said this was a high dose, and patient requested 2.5 mg weekly to start, which she started in 2024. Patient then uptitrated to 5 mg weekly on 2024. Will discontinue mounjaro due to constipation and start trulicity 0.75 mg which was previously well tolerated. Prolia today and again in 6 months. Advised to take Tresiba daily, hold only if low glucose consistently 80s or below.    Past Medical History:   Diagnosis Date    Diabetes mellitus, type 2     Hyperlipidemia     Hypertension     Unspecified osteoarthritis, unspecified site         Past Surgical History:   Procedure Laterality Date    APPENDECTOMY       SECTION      1970    EYE SURGERY  ??    Eye muscle surgery    HYSTERECTOMY      JOINT REPLACEMENT   & 2019 left knee 2019 right knee    KNEE SURGERY      ,    TONSILLECTOMY         Allergy:  Review of patient's allergies indicates:   Allergen Reactions    Penicillins Rash        Current Medications:    Current Outpatient Medications:     ascorbic acid, vitamin C, (VITAMIN C) 1000 MG tablet, Take 1,000 mg by mouth once daily., Disp: , Rfl:     cyanocobalamin (VITAMIN B-12) 1000 MCG tablet, Take 100  "mcg by mouth once daily., Disp: , Rfl:     denosumab (PROLIA) 60 mg/mL Syrg, Inject 60 mg into the skin., Disp: , Rfl:     glimepiride (AMARYL) 4 MG tablet, Take 4 mg by mouth 2 (two) times daily., Disp: , Rfl:     lisinopriL-hydrochlorothiazide (PRINZIDE,ZESTORETIC) 20-25 mg Tab, Take 1 tablet by mouth., Disp: , Rfl:     metoprolol succinate (TOPROL-XL) 50 MG 24 hr tablet, Take 50 mg by mouth., Disp: , Rfl:     omega-3 fatty acids/fish oil (FISH OIL-OMEGA-3 FATTY ACIDS) 300-1,000 mg capsule, Take by mouth once daily., Disp: , Rfl:     pioglitazone (ACTOS) 30 MG tablet, Take 30 mg by mouth., Disp: , Rfl:     pravastatin (PRAVACHOL) 80 MG tablet, Take 80 mg by mouth every evening., Disp: , Rfl:     tirzepatide (MOUNJARO) 5 mg/0.5 mL PnIj, Inject 5mg sq weekly, Disp: 2 mL, Rfl: 0    TRESIBA FLEXTOUCH U-200 200 unit/mL (3 mL) insulin pen, Inject 50 Units into the skin once daily., Disp: 3 pen , Rfl: 6    vitamin D (VITAMIN D3) 1000 units Tab, Take 1,000 Units by mouth once daily., Disp: , Rfl:     zinc gluconate 50 mg tablet, Take by mouth once daily., Disp: , Rfl:     pantoprazole (PROTONIX) 20 MG tablet, Take 20 mg by mouth. (Patient not taking: Reported on 6/13/2024), Disp: , Rfl:     sucralfate (CARAFATE) 100 mg/mL suspension, Take by mouth. (Patient not taking: Reported on 6/13/2024), Disp: , Rfl:      Social History:   reports that she has never smoked. She has never used smokeless tobacco. She reports that she does not drink alcohol and does not use drugs.     Family History   Problem Relation Name Age of Onset    Heart disease Father Chun Everett     Heart disease Paternal Aunt Sarah and sridhar     Arthritis Mother Xenia everett           There is no immunization history on file for this patient.    ROS:  (+) constipation    Objective:      Physical Exam  Vitals:    06/13/24 0946   BP: 137/89   Pulse: 83   Resp: 16   Temp: 98 °F (36.7 °C)   TempSrc: Oral   Weight: 93.3 kg (205 lb 12.8 oz)   Height: 5' 2" " (1.575 m)        Wt Readings from Last 3 Encounters:   06/13/24 93.3 kg (205 lb 12.8 oz)   03/22/24 94 kg (207 lb 3.7 oz)   12/06/23 92.1 kg (203 lb)       Physical Exam  Constitutional:       General: She is not in acute distress.  HENT:      Head: Normocephalic and atraumatic.   Cardiovascular:      Rate and Rhythm: Normal rate.   Pulmonary:      Effort: Pulmonary effort is normal.      Breath sounds: Normal breath sounds.   Abdominal:      General: There is no distension.      Palpations: Abdomen is soft.      Tenderness: There is no abdominal tenderness.   Neurological:      Mental Status: She is alert.         Body mass index is 37.64 kg/m².      Office Visit on 06/13/2024   Component Date Value Ref Range Status    Hemoglobin A1C, POC 06/13/2024 8  % Final   Office Visit on 03/22/2024   Component Date Value Ref Range Status    Urine Microalbumin 03/22/2024 10.6  <=30.0 ug/ml Final    Urine Creatinine 03/22/2024 160.9 (H)  45.0 - 106.0 mg/dL Final    Microalbumin Creatinine Ratio 03/22/2024 6.6  0.0 - 30.0 mg/gm Cr Final    Vitamin D 03/22/2024 40.4  30.0 - 80.0 ng/mL Final    Cholesterol Total 03/22/2024 228 (H)  <=200 mg/dL Final    HDL Cholesterol 03/22/2024 62 (H)  35 - 60 mg/dL Final    Triglyceride 03/22/2024 255 (H)  37 - 140 mg/dL Final    Cholesterol/HDL Ratio 03/22/2024 4  0 - 5 Final    Very Low Density Lipoprotein 03/22/2024 51   Final    LDL Cholesterol 03/22/2024 115.00  50.00 - 140.00 mg/dL Final    Sodium 03/22/2024 144  136 - 145 mmol/L Final    Potassium 03/22/2024 3.9  3.5 - 5.1 mmol/L Final    Chloride 03/22/2024 104  98 - 107 mmol/L Final    CO2 03/22/2024 31  23 - 31 mmol/L Final    Glucose 03/22/2024 107  82 - 115 mg/dL Final    Blood Urea Nitrogen 03/22/2024 20.1  9.8 - 20.1 mg/dL Final    Creatinine 03/22/2024 0.95  0.55 - 1.02 mg/dL Final    Calcium 03/22/2024 10.8 (H)  8.4 - 10.2 mg/dL Final    Protein Total 03/22/2024 7.6  5.8 - 7.6 gm/dL Final    Albumin 03/22/2024 4.1  3.4 - 4.8  g/dL Final    Globulin 03/22/2024 3.5  2.4 - 3.5 gm/dL Final    Albumin/Globulin Ratio 03/22/2024 1.2  1.1 - 2.0 ratio Final    Bilirubin Total 03/22/2024 0.6  <=1.5 mg/dL Final    ALP 03/22/2024 66  40 - 150 unit/L Final    ALT 03/22/2024 15  0 - 55 unit/L Final    AST 03/22/2024 14  5 - 34 unit/L Final    eGFR 03/22/2024 >60  mls/min/1.73/m2 Final           Assessment:   1) DM type II - poorly controlled  2) Primary Hyperparathyroidism  3) Hypercalcemia; likely 2/2 #2  4) Osteoporosis; suspected to be 2/2 to #2    Plan  - POC A1c 8 in 6/2024, previously POC 9.5 at last visit  - Discussed starting scheduled Tresiba 50 units daily in the morning. Hold for CBG < 60  - Discontinue Mounjaro due to constipation  - Start Trulicity 0.75 mg, previously well tolerated per patient  - C/w Amaryl 4mg BID & Actos 30mg qD   - Continue Prolia, initial injection 12/2023, injection today 6/2024, next 12/2024  - also discussed workup for primary hyperparathyroidism including ultrasound parathyroid and sestamibi scan with potential progression to surgery if significant; patient continues want to monitor and defers any surgical intervention at this time so don't recommend scan   - Ca 10.8 in 3/2024, PTH 77 in 6/2023  - Urine microalb cr 6.6 wnl in 3/2024  - DM foot exam : completed last visit on 12/2023  - DM eye exam : last eye exam in Jan 2024 per patient, will get records      RTC in 6 months      Radhika Cash MD  [facility] Internal Medicine, PRG[facility]2  6/13/2024

## 2025-05-07 DIAGNOSIS — M54.41 RIGHT-SIDED LOW BACK PAIN WITH RIGHT-SIDED SCIATICA: Primary | ICD-10-CM

## 2025-05-19 ENCOUNTER — TELEPHONE (OUTPATIENT)
Dept: ENDOCRINOLOGY | Facility: CLINIC | Age: 75
End: 2025-05-19
Payer: MEDICARE

## 2025-05-19 NOTE — TELEPHONE ENCOUNTER
Received refill request for Dexcom G7, she has not been seen since 6/2024.    Phoned patient left voice message asking her to contact clinic in regards to scheudling a follow-up.

## 2025-06-03 ENCOUNTER — OFFICE VISIT (OUTPATIENT)
Dept: ENDOCRINOLOGY | Facility: CLINIC | Age: 75
End: 2025-06-03
Payer: MEDICARE

## 2025-06-03 VITALS
SYSTOLIC BLOOD PRESSURE: 121 MMHG | WEIGHT: 217.94 LBS | BODY MASS INDEX: 40.11 KG/M2 | RESPIRATION RATE: 12 BRPM | DIASTOLIC BLOOD PRESSURE: 70 MMHG | HEART RATE: 93 BPM | TEMPERATURE: 98 F | HEIGHT: 62 IN

## 2025-06-03 DIAGNOSIS — M81.6 LOCALIZED OSTEOPOROSIS WITHOUT CURRENT PATHOLOGICAL FRACTURE: ICD-10-CM

## 2025-06-03 DIAGNOSIS — Z79.4 TYPE 2 DIABETES MELLITUS WITH HYPERGLYCEMIA, WITH LONG-TERM CURRENT USE OF INSULIN: Primary | ICD-10-CM

## 2025-06-03 DIAGNOSIS — E11.65 TYPE 2 DIABETES MELLITUS WITH HYPERGLYCEMIA, WITH LONG-TERM CURRENT USE OF INSULIN: Primary | ICD-10-CM

## 2025-06-03 DIAGNOSIS — E21.1 HYPERPARATHYROIDISM , SECONDARY, NON-RENAL: ICD-10-CM

## 2025-06-03 PROCEDURE — 3288F FALL RISK ASSESSMENT DOCD: CPT | Mod: CPTII,,, | Performed by: NURSE PRACTITIONER

## 2025-06-03 PROCEDURE — 1126F AMNT PAIN NOTED NONE PRSNT: CPT | Mod: CPTII,,, | Performed by: NURSE PRACTITIONER

## 2025-06-03 PROCEDURE — 3078F DIAST BP <80 MM HG: CPT | Mod: CPTII,,, | Performed by: NURSE PRACTITIONER

## 2025-06-03 PROCEDURE — 1160F RVW MEDS BY RX/DR IN RCRD: CPT | Mod: CPTII,,, | Performed by: NURSE PRACTITIONER

## 2025-06-03 PROCEDURE — 1101F PT FALLS ASSESS-DOCD LE1/YR: CPT | Mod: CPTII,,, | Performed by: NURSE PRACTITIONER

## 2025-06-03 PROCEDURE — 99214 OFFICE O/P EST MOD 30 MIN: CPT | Mod: PBBFAC | Performed by: NURSE PRACTITIONER

## 2025-06-03 PROCEDURE — 99204 OFFICE O/P NEW MOD 45 MIN: CPT | Mod: S$PBB,,, | Performed by: NURSE PRACTITIONER

## 2025-06-03 PROCEDURE — 3074F SYST BP LT 130 MM HG: CPT | Mod: CPTII,,, | Performed by: NURSE PRACTITIONER

## 2025-06-03 PROCEDURE — 83036 HEMOGLOBIN GLYCOSYLATED A1C: CPT | Mod: PBBFAC | Performed by: NURSE PRACTITIONER

## 2025-06-03 PROCEDURE — 1159F MED LIST DOCD IN RCRD: CPT | Mod: CPTII,,, | Performed by: NURSE PRACTITIONER

## 2025-06-03 RX ORDER — PEN NEEDLE, DIABETIC 30 GX3/16"
1 NEEDLE, DISPOSABLE MISCELLANEOUS DAILY PRN
COMMUNITY
Start: 2024-11-27

## 2025-06-03 RX ORDER — GLIMEPIRIDE 4 MG/1
4 TABLET ORAL
Qty: 180 TABLET | Refills: 1 | Status: SHIPPED | OUTPATIENT
Start: 2025-06-03

## 2025-06-03 RX ORDER — HYDROCHLOROTHIAZIDE 25 MG/1
25 TABLET ORAL
COMMUNITY
Start: 2025-05-24

## 2025-06-03 RX ORDER — PIOGLITAZONE 30 MG/1
30 TABLET ORAL DAILY
Qty: 90 TABLET | Refills: 1 | Status: SHIPPED | OUTPATIENT
Start: 2025-06-03 | End: 2025-06-03

## 2025-06-03 RX ORDER — INSULIN DEGLUDEC 200 U/ML
15 INJECTION, SOLUTION SUBCUTANEOUS DAILY
Qty: 3 PEN | Refills: 6 | Status: SHIPPED | OUTPATIENT
Start: 2025-06-03

## 2025-06-04 LAB — HBA1C MFR BLD: >14 %

## 2025-06-09 ENCOUNTER — TELEPHONE (OUTPATIENT)
Dept: ENDOCRINOLOGY | Facility: CLINIC | Age: 75
End: 2025-06-09
Payer: MEDICARE

## 2025-06-09 NOTE — TELEPHONE ENCOUNTER
----- Message from Nirmala sent at 6/9/2025 11:14 AM CDT -----  Pt of [facility] called left a message stating that she still need her glucose monitor.Pt requesting a call back at [phone] advise

## 2025-06-09 NOTE — TELEPHONE ENCOUNTER
Returned pt call. Ms. Gaitan inquiring about status of Dexcom equipment. Advised pt that documentation was sent to [facility] on 06/06/2025.

## 2025-06-13 ENCOUNTER — HOSPITAL ENCOUNTER (OUTPATIENT)
Dept: RADIOLOGY | Facility: HOSPITAL | Age: 75
Discharge: HOME | End: 2025-06-13
Attending: STUDENT IN AN ORGANIZED HEALTH CARE EDUCATION/TRAINING PROGRAM
Payer: MEDICARE

## 2025-06-13 DIAGNOSIS — Z12.31 OTHER SCREENING MAMMOGRAM: ICD-10-CM

## 2025-06-13 DIAGNOSIS — N95.9 AT RISK FOR OSTEOPOROSIS IN PREMENOPAUSAL PATIENT: ICD-10-CM

## 2025-06-13 DIAGNOSIS — M81.0 SENILE OSTEOPOROSIS: ICD-10-CM

## 2025-06-13 DIAGNOSIS — Z91.89 AT RISK FOR OSTEOPOROSIS IN PREMENOPAUSAL PATIENT: ICD-10-CM

## 2025-06-13 PROCEDURE — 77067 SCR MAMMO BI INCL CAD: CPT | Mod: TC

## 2025-06-13 PROCEDURE — 77063 BREAST TOMOSYNTHESIS BI: CPT | Mod: 26,,, | Performed by: RADIOLOGY

## 2025-06-13 PROCEDURE — 77080 DXA BONE DENSITY AXIAL: CPT | Mod: XU,TC

## 2025-06-13 PROCEDURE — 77081 DXA BONE DENSITY APPENDICULR: CPT | Mod: TC

## 2025-06-13 PROCEDURE — 77067 SCR MAMMO BI INCL CAD: CPT | Mod: 26,,, | Performed by: RADIOLOGY

## 2025-06-20 ENCOUNTER — DOCUMENTATION ONLY (OUTPATIENT)
Dept: ENDOCRINOLOGY | Facility: CLINIC | Age: 75
End: 2025-06-20
Payer: MEDICARE

## 2025-06-20 LAB
LEFT EYE DM RETINOPATHY: NEGATIVE
RIGHT EYE DM RETINOPATHY: NEGATIVE

## 2025-08-11 ENCOUNTER — OFFICE VISIT (OUTPATIENT)
Dept: ENDOCRINOLOGY | Facility: CLINIC | Age: 75
End: 2025-08-11
Payer: MEDICARE

## 2025-08-11 ENCOUNTER — LAB VISIT (OUTPATIENT)
Dept: LAB | Facility: HOSPITAL | Age: 75
End: 2025-08-11
Attending: NURSE PRACTITIONER
Payer: MEDICARE

## 2025-08-11 ENCOUNTER — TELEPHONE (OUTPATIENT)
Dept: ENDOCRINOLOGY | Facility: CLINIC | Age: 75
End: 2025-08-11

## 2025-08-11 VITALS
SYSTOLIC BLOOD PRESSURE: 132 MMHG | HEIGHT: 62 IN | TEMPERATURE: 98 F | DIASTOLIC BLOOD PRESSURE: 76 MMHG | BODY MASS INDEX: 37.99 KG/M2 | RESPIRATION RATE: 12 BRPM | WEIGHT: 206.44 LBS | HEART RATE: 74 BPM

## 2025-08-11 DIAGNOSIS — M81.6 LOCALIZED OSTEOPOROSIS WITHOUT CURRENT PATHOLOGICAL FRACTURE: ICD-10-CM

## 2025-08-11 DIAGNOSIS — B35.4 TINEA CORPORIS: ICD-10-CM

## 2025-08-11 DIAGNOSIS — E21.1 HYPERPARATHYROIDISM , SECONDARY, NON-RENAL: ICD-10-CM

## 2025-08-11 DIAGNOSIS — E21.1 HYPERPARATHYROIDISM , SECONDARY, NON-RENAL: Primary | ICD-10-CM

## 2025-08-11 DIAGNOSIS — E11.65 TYPE 2 DIABETES MELLITUS WITH HYPERGLYCEMIA, WITH LONG-TERM CURRENT USE OF INSULIN: ICD-10-CM

## 2025-08-11 DIAGNOSIS — Z79.4 TYPE 2 DIABETES MELLITUS WITH HYPERGLYCEMIA, WITH LONG-TERM CURRENT USE OF INSULIN: ICD-10-CM

## 2025-08-11 LAB
25(OH)D3+25(OH)D2 SERPL-MCNC: 44 NG/ML (ref 30–80)
ALBUMIN SERPL-MCNC: 4.1 G/DL (ref 3.4–4.8)
BUN SERPL-MCNC: 24.7 MG/DL (ref 9.8–20.1)
CALCIUM SERPL-MCNC: 10.3 MG/DL (ref 8.4–10.2)
CHLORIDE SERPL-SCNC: 105 MMOL/L (ref 98–107)
CO2 SERPL-SCNC: 29 MMOL/L (ref 23–31)
CREAT SERPL-MCNC: 0.96 MG/DL (ref 0.55–1.02)
GFR SERPLBLD CREATININE-BSD FMLA CKD-EPI: >60 ML/MIN/1.73/M2
GLUCOSE SERPL-MCNC: 280 MG/DL (ref 82–115)
HBA1C MFR BLD: 9 %
MAGNESIUM SERPL-MCNC: 1.9 MG/DL (ref 1.6–2.6)
PHOSPHATE SERPL-MCNC: 2.6 MG/DL (ref 2.3–4.7)
POTASSIUM SERPL-SCNC: 3.8 MMOL/L (ref 3.5–5.1)
PTH-INTACT SERPL-MCNC: 122.6 PG/ML (ref 8.7–77)
SODIUM SERPL-SCNC: 141 MMOL/L (ref 136–145)

## 2025-08-11 PROCEDURE — 3075F SYST BP GE 130 - 139MM HG: CPT | Mod: CPTII,,, | Performed by: NURSE PRACTITIONER

## 2025-08-11 PROCEDURE — 82306 VITAMIN D 25 HYDROXY: CPT

## 2025-08-11 PROCEDURE — 80069 RENAL FUNCTION PANEL: CPT

## 2025-08-11 PROCEDURE — 3288F FALL RISK ASSESSMENT DOCD: CPT | Mod: CPTII,,, | Performed by: NURSE PRACTITIONER

## 2025-08-11 PROCEDURE — 2023F DILAT RTA XM W/O RTNOPTHY: CPT | Mod: CPTII,,, | Performed by: NURSE PRACTITIONER

## 2025-08-11 PROCEDURE — 99214 OFFICE O/P EST MOD 30 MIN: CPT | Mod: S$PBB,,, | Performed by: NURSE PRACTITIONER

## 2025-08-11 PROCEDURE — 3078F DIAST BP <80 MM HG: CPT | Mod: CPTII,,, | Performed by: NURSE PRACTITIONER

## 2025-08-11 PROCEDURE — 99215 OFFICE O/P EST HI 40 MIN: CPT | Mod: PBBFAC | Performed by: NURSE PRACTITIONER

## 2025-08-11 PROCEDURE — 83735 ASSAY OF MAGNESIUM: CPT

## 2025-08-11 PROCEDURE — 1160F RVW MEDS BY RX/DR IN RCRD: CPT | Mod: CPTII,,, | Performed by: NURSE PRACTITIONER

## 2025-08-11 PROCEDURE — 83970 ASSAY OF PARATHORMONE: CPT

## 2025-08-11 PROCEDURE — 1159F MED LIST DOCD IN RCRD: CPT | Mod: CPTII,,, | Performed by: NURSE PRACTITIONER

## 2025-08-11 PROCEDURE — 36415 COLL VENOUS BLD VENIPUNCTURE: CPT

## 2025-08-11 PROCEDURE — 1126F AMNT PAIN NOTED NONE PRSNT: CPT | Mod: CPTII,,, | Performed by: NURSE PRACTITIONER

## 2025-08-11 PROCEDURE — 83036 HEMOGLOBIN GLYCOSYLATED A1C: CPT | Mod: PBBFAC | Performed by: NURSE PRACTITIONER

## 2025-08-11 PROCEDURE — 1100F PTFALLS ASSESS-DOCD GE2>/YR: CPT | Mod: CPTII,,, | Performed by: NURSE PRACTITIONER

## 2025-08-11 RX ORDER — FLUCONAZOLE 150 MG/1
150 TABLET ORAL
Qty: 6 TABLET | Refills: 0 | Status: SHIPPED | OUTPATIENT
Start: 2025-08-11 | End: 2025-08-27

## 2025-08-11 RX ORDER — MICONAZOLE NITRATE 2 G/100G
POWDER TOPICAL 2 TIMES DAILY
Qty: 71 G | Refills: 0 | Status: SHIPPED | OUTPATIENT
Start: 2025-08-11 | End: 2025-08-18

## 2025-08-13 ENCOUNTER — RESULTS FOLLOW-UP (OUTPATIENT)
Dept: ENDOCRINOLOGY | Facility: CLINIC | Age: 75
End: 2025-08-13
Payer: MEDICARE

## 2025-08-13 DIAGNOSIS — E21.1 HYPERPARATHYROIDISM , SECONDARY, NON-RENAL: Primary | ICD-10-CM

## 2025-08-19 ENCOUNTER — HOSPITAL ENCOUNTER (OUTPATIENT)
Dept: RADIOLOGY | Facility: HOSPITAL | Age: 75
Discharge: HOME | End: 2025-08-19
Attending: NURSE PRACTITIONER
Payer: MEDICARE

## 2025-08-19 DIAGNOSIS — E21.1 HYPERPARATHYROIDISM , SECONDARY, NON-RENAL: ICD-10-CM

## 2025-08-19 PROCEDURE — 76536 US EXAM OF HEAD AND NECK: CPT | Mod: TC
